# Patient Record
Sex: MALE | Race: WHITE | NOT HISPANIC OR LATINO | URBAN - METROPOLITAN AREA
[De-identification: names, ages, dates, MRNs, and addresses within clinical notes are randomized per-mention and may not be internally consistent; named-entity substitution may affect disease eponyms.]

---

## 2020-02-13 ENCOUNTER — IMMUNIZATION (OUTPATIENT)
Dept: PHARMACY | Facility: CLINIC | Age: 55
End: 2020-02-13

## 2022-01-03 ENCOUNTER — OFFICE VISIT (OUTPATIENT)
Dept: ORTHOPEDICS | Facility: CLINIC | Age: 57
End: 2022-01-03
Payer: COMMERCIAL

## 2022-01-03 ENCOUNTER — TELEPHONE (OUTPATIENT)
Dept: ORTHOPEDICS | Facility: CLINIC | Age: 57
End: 2022-01-03
Payer: COMMERCIAL

## 2022-01-03 ENCOUNTER — HOSPITAL ENCOUNTER (OUTPATIENT)
Dept: RADIOLOGY | Facility: HOSPITAL | Age: 57
Discharge: HOME | End: 2022-01-03
Attending: ORTHOPAEDIC SURGERY
Payer: COMMERCIAL

## 2022-01-03 VITALS — BODY MASS INDEX: 32.89 KG/M2 | WEIGHT: 256.31 LBS | HEIGHT: 74 IN

## 2022-01-03 DIAGNOSIS — M54.2 CERVICAL PAIN: ICD-10-CM

## 2022-01-03 DIAGNOSIS — M54.6 THORACIC BACK PAIN, UNSPECIFIED BACK PAIN LATERALITY, UNSPECIFIED CHRONICITY: ICD-10-CM

## 2022-01-03 DIAGNOSIS — S22.060A CLOSED WEDGE COMPRESSION FRACTURE OF T7 VERTEBRA, INITIAL ENCOUNTER: ICD-10-CM

## 2022-01-03 DIAGNOSIS — M54.50 LUMBAR PAIN: ICD-10-CM

## 2022-01-03 DIAGNOSIS — M54.50 LUMBAR PAIN: Primary | ICD-10-CM

## 2022-01-03 DIAGNOSIS — M47.816 LUMBAR SPONDYLOSIS: Primary | ICD-10-CM

## 2022-01-03 PROCEDURE — 72070 X-RAY EXAM THORAC SPINE 2VWS: CPT | Mod: 26,,, | Performed by: RADIOLOGY

## 2022-01-03 PROCEDURE — 72110 X-RAY EXAM L-2 SPINE 4/>VWS: CPT | Mod: 26,,, | Performed by: RADIOLOGY

## 2022-01-03 PROCEDURE — 72070 XR THORACIC SPINE AP LATERAL: ICD-10-PCS | Mod: 26,,, | Performed by: RADIOLOGY

## 2022-01-03 PROCEDURE — 99999 PR PBB SHADOW E&M-NEW PATIENT-LVL III: ICD-10-PCS | Mod: PBBFAC,,, | Performed by: ORTHOPAEDIC SURGERY

## 2022-01-03 PROCEDURE — 99999 PR PBB SHADOW E&M-NEW PATIENT-LVL III: CPT | Mod: PBBFAC,,, | Performed by: ORTHOPAEDIC SURGERY

## 2022-01-03 PROCEDURE — 72110 XR LUMBAR SPINE AP AND LAT WITH FLEX/EXT: ICD-10-PCS | Mod: 26,,, | Performed by: RADIOLOGY

## 2022-01-03 PROCEDURE — 72110 X-RAY EXAM L-2 SPINE 4/>VWS: CPT | Mod: TC

## 2022-01-03 PROCEDURE — 72050 X-RAY EXAM NECK SPINE 4/5VWS: CPT | Mod: 26,,, | Performed by: RADIOLOGY

## 2022-01-03 PROCEDURE — 99204 PR OFFICE/OUTPT VISIT, NEW, LEVL IV, 45-59 MIN: ICD-10-PCS | Mod: S$GLB,,, | Performed by: ORTHOPAEDIC SURGERY

## 2022-01-03 PROCEDURE — 99204 OFFICE O/P NEW MOD 45 MIN: CPT | Mod: S$GLB,,, | Performed by: ORTHOPAEDIC SURGERY

## 2022-01-03 PROCEDURE — 72050 X-RAY EXAM NECK SPINE 4/5VWS: CPT | Mod: TC

## 2022-01-03 PROCEDURE — 72070 X-RAY EXAM THORAC SPINE 2VWS: CPT | Mod: TC

## 2022-01-03 PROCEDURE — 72050 XR CERVICAL SPINE AP LAT WITH FLEX EXTEN: ICD-10-PCS | Mod: 26,,, | Performed by: RADIOLOGY

## 2022-01-03 RX ORDER — SUCRALFATE 1 G/1
1 TABLET ORAL DAILY
COMMUNITY

## 2022-01-03 RX ORDER — LOPERAMIDE HYDROCHLORIDE 2 MG/1
2 CAPSULE ORAL 2 TIMES DAILY PRN
COMMUNITY
Start: 2021-08-03 | End: 2023-02-28

## 2022-01-03 RX ORDER — ATORVASTATIN CALCIUM 20 MG/1
1 TABLET, FILM COATED ORAL NIGHTLY
COMMUNITY

## 2022-01-03 RX ORDER — TIOTROPIUM BROMIDE AND OLODATEROL 3.124; 2.736 UG/1; UG/1
2 SPRAY, METERED RESPIRATORY (INHALATION) DAILY
COMMUNITY
Start: 2021-10-22

## 2022-01-03 RX ORDER — ACETAMINOPHEN 500 MG
TABLET ORAL
COMMUNITY
End: 2023-02-28

## 2022-01-03 RX ORDER — LEVOTHYROXINE SODIUM 125 UG/1
1 TABLET ORAL DAILY
COMMUNITY
End: 2022-03-25

## 2022-01-03 RX ORDER — BUSPIRONE HYDROCHLORIDE 15 MG/1
TABLET ORAL
COMMUNITY

## 2022-01-03 RX ORDER — PREDNISONE 10 MG/1
TABLET ORAL
COMMUNITY
Start: 2021-12-21 | End: 2022-03-24

## 2022-01-03 RX ORDER — DIVALPROEX SODIUM 250 MG/1
3 TABLET, DELAYED RELEASE ORAL 2 TIMES DAILY
COMMUNITY
Start: 2021-09-08 | End: 2023-02-28

## 2022-01-03 RX ORDER — OMEPRAZOLE 40 MG/1
1 CAPSULE, DELAYED RELEASE ORAL DAILY
COMMUNITY

## 2022-01-03 RX ORDER — METOPROLOL TARTRATE 25 MG/1
1 TABLET, FILM COATED ORAL DAILY
COMMUNITY
End: 2023-02-28

## 2022-01-03 RX ORDER — ERGOCALCIFEROL 1.25 MG/1
1 CAPSULE ORAL
COMMUNITY
End: 2022-03-24

## 2022-01-03 NOTE — PROGRESS NOTES
DATE: 1/3/2022  PATIENT: Binh Garvin    Attending Physician: Riccardo Hurtado M.D.    CHIEF COMPLAINT: back pain    HISTORY:  Binh Garvin is a 56 y.o. male with here for initial evaluation of low back pain (Back - 4). The pain has been present for 25 years. The patient describes the pain as achy.  The pain is worse with prolonged standing and bending over and improved by massage and heat. There is no associated numbness and tingling. There is no subjective weakness. Prior treatments have included tylenol and JOSE J (around 2011), but no surgery. He used to be enrolled in pain management program for his back and took norco and muscle relaxers however when he tested positive for methamphetamine use he was removed from prior pain management program. He reports regret over this and reports quitting completely and is only on tylenol at this time. Smokes 3/4 of a pack per day. Of note was recently admitted to a hospital in [place] for seizures and was intubated per wife.     The Patient denies myelopathic symptoms such as handwriting changes or difficulty with buttons/coins/keys. Denies perineal paresthesias, bowel/bladder dysfunction.    PAST MEDICAL/SURGICAL HISTORY:  Past Medical History:   Diagnosis Date    A-fib     Acid reflux     Agitation     Onofre's esophagus     Chronic back pain     COPD (chronic obstructive pulmonary disease)     Depression     GERD (gastroesophageal reflux disease)     Graves disease     Heart disease     History of stomach ulcers     Hyperlipidemia     Impairment of cognitive function     Memory loss     Migraines     Mood disorder     PTSD (post-traumatic stress disorder)     Seizures     Substance abuse     TBI (traumatic brain injury)      History reviewed. No pertinent surgical history.    Current Medications:   Current Outpatient Medications:     acetaminophen (TYLENOL) 500 MG tablet, 1,000 mg in morning and night, Disp: , Rfl:     atorvastatin (LIPITOR) 20 MG  tablet, Take 1 tablet by mouth every evening., Disp: , Rfl:     busPIRone (BUSPAR) 15 MG tablet, 15mg qam 30mg qhs, Disp: , Rfl:     divalproex (DEPAKOTE) 250 MG EC tablet, Take 3 tablets by mouth 2 (two) times a day., Disp: , Rfl:     ergocalciferol (ERGOCALCIFEROL) 50,000 unit Cap, Take 1 capsule by mouth every 7 days., Disp: , Rfl:     levothyroxine (SYNTHROID) 125 MCG tablet, Take 1 tablet by mouth once daily., Disp: , Rfl:     loperamide (IMODIUM) 2 mg capsule, Take 2 mg by mouth 2 (two) times daily as needed., Disp: , Rfl:     metoprolol tartrate (LOPRESSOR) 25 MG tablet, Take 1 tablet by mouth once daily., Disp: , Rfl:     multivitamin,tx-iron-minerals (COMPLETE MULTIVITAMIN) Tab, morning, Disp: , Rfl:     omeprazole (PRILOSEC) 40 MG capsule, Take 1 capsule by mouth once daily., Disp: , Rfl:     predniSONE (DELTASONE) 10 MG tablet, Take by mouth., Disp: , Rfl:     STIOLTO RESPIMAT 2.5-2.5 mcg/actuation Mist, Take 2 puffs by mouth once daily., Disp: , Rfl:     sucralfate (CARAFATE) 1 gram tablet, Take 1 g by mouth once daily., Disp: , Rfl:     Social History:   Social History     Socioeconomic History    Marital status:    Tobacco Use    Smoking status: Current Every Day Smoker     Types: Cigarettes    Smokeless tobacco: Never Used       REVIEW OF SYSTEMS:  Constitution: Negative. Negative for chills, fever and night sweats.   Cardiovascular: Negative for chest pain and syncope.   Respiratory: Negative for cough and shortness of breath.   Gastrointestinal: See HPI. Negative for nausea/vomiting. Negative for abdominal pain.  Genitourinary: See HPI. Negative for discoloration or dysuria.  Skin: Negative for dry skin, itching and rash.   Hematologic/Lymphatic: Negative for bleeding/clotting disorders.   Musculoskeletal: Negative for falls and muscle weakness.   Neurological: See HPI. Positive significant history of seizures. No history of cranial surgery or shunts.  Endocrine: Negative for  "polydipsia, polyphagia and polyuria.   Allergic/Immunologic: Negative for hives and persistent infections.    PHYSICAL EXAMINATION:    Ht 6' 2" (1.88 m)   Wt 116.3 kg (256 lb 4.6 oz)   BMI 32.91 kg/m²     General: The patient is a pleasant 56 y.o. male in no apparent distress, the patient is orientatied to person, place and time.   Psych: Normal mood and affect  HEENT: Vision grossly intact, hearing intact to the spoken word.  Lungs: Respirations unlabored.  Gait: Normal station and gait, no difficulty with toe or heel walk.   Skin: Dorsal lumbar skin negative for rashes, lesions, hairy patches and surgical scars.  Range of motion: Lumbar range of motion is acceptable. There is mild lumbar tenderness to palpation.  Spinal Balance: Global saggital and coronal spinal balance acceptable, no significant for scoliosis and kyphosis.  Musculoskeletal: No pain with the range of motion of the bilateral hips. No trochanteric tenderness to palpation.  Vascular: Bilateral lower extremities warm and well perfused, Dorsalis pedis pulses 2+ bilaterally.  Neurological: Normal strength and tone in all major motor groups in the bilateral lower extremities. Normal sensation to light touch in the L2-S1 dermatomes bilaterally.  Deep tendon reflexes symmetric 2+ in the bilateral lower extremities.  Negative Babinski bilaterally.    IMAGING:   Today I personally reviewed AP, Lat and Flex/Ex upright L-spine films that demonstrate mild DJD, no fracture, no dislocation, no listhesis.     Outside MRI brought on disc which showed no significant central or foraminal stenosis    Body mass index is 32.91 kg/m².  No results found for: HGBA1C      ASSESSMENT/PLAN:    Binh was seen today for low-back pain and mid-back pain.    Diagnoses and all orders for this visit:    Lumbar spondylosis  -     Ambulatory referral/consult to Pain Clinic; Future    Closed wedge compression fracture of T7 vertebra, initial encounter      Follow up if symptoms " worsen or fail to improve.    Patient has DJD and chronic back pain. I discussed the natural history of their diagnoses as well as surgical and nonsurgical treatment options. I educated the patient on the importance of core/back strengthening, correct posture, bending/lifting ergonomics, and low-impact aerobic exercises (walking, elliptical, and aquatherapy). No acute surgical intervention warranted. I will refer the patient to pain managment for evaluation and treatment. Patient will follow up prn.    I have personally examined the patient and agree with the above plan.    Riccardo Hurtado MD  Orthopaedic Spine Surgeon  Department of Orthopaedic Surgery  [phone]

## 2022-01-10 ENCOUNTER — TELEPHONE (OUTPATIENT)
Dept: PAIN MEDICINE | Facility: CLINIC | Age: 57
End: 2022-01-10
Payer: COMMERCIAL

## 2022-01-10 NOTE — TELEPHONE ENCOUNTER
This message is for patient in regards to his/her appointment 1/11/22 at 9:30 am.      [facility] Policy: For the safety of all patients and staff members.     Patient Visitor policy: Due to social distancing and limited seating staff are requesting patient to arrive to their schedule appointments on time. During this visit we are asking all patients to only have one visitor over the age of 18yrs old to accompany them to be seen by Dr. Jean. If patient do not required assistance with their visit, we're asking all visitors to remain outside the waiting area.    Upon arriving to your schedule appointment; patients are required to wear a face mask, if patient do not have a face mask one will be provided entering the facility. We ask patients to contact clinic at this number [phone] to notify staff that they have arrived or they may do so by utilizing the Mobile checked in [place](if patient have patient portal; clinical staff will send a message through there letting them know it's okay to proceed to their visit). If you have any questions or concerns please contact [phone]       also inquired IPM within message.    [facility] providers and Mid-levels offer interventional, procedure--based options to treat chronic pain. The goal is to manage chronic pain by reducing pain frequency and intensity and address your functional goals for activities of daily living while simultaneously reducing or eliminating your reliance on medications. Please bring any records or images that you have from prior treatments for your pain. You will be presented with multi-modal treatment plan that may or may not include imaging, interventional procedures, physical/occupational/aqua therapy, pain creams, and non-narcotic pain medications used for the treatments of chronic pain.

## 2022-01-11 ENCOUNTER — OFFICE VISIT (OUTPATIENT)
Dept: PAIN MEDICINE | Facility: CLINIC | Age: 57
End: 2022-01-11
Payer: COMMERCIAL

## 2022-01-11 VITALS
HEIGHT: 74 IN | TEMPERATURE: 98 F | WEIGHT: 257 LBS | BODY MASS INDEX: 32.98 KG/M2 | DIASTOLIC BLOOD PRESSURE: 77 MMHG | HEART RATE: 65 BPM | RESPIRATION RATE: 18 BRPM | SYSTOLIC BLOOD PRESSURE: 116 MMHG

## 2022-01-11 DIAGNOSIS — M47.816 LUMBAR SPONDYLOSIS: ICD-10-CM

## 2022-01-11 DIAGNOSIS — M54.16 LUMBAR RADICULOPATHY: Primary | ICD-10-CM

## 2022-01-11 DIAGNOSIS — M54.59 DISCOGENIC LUMBAR PAIN: ICD-10-CM

## 2022-01-11 PROCEDURE — 99204 OFFICE O/P NEW MOD 45 MIN: CPT | Mod: S$GLB,,, | Performed by: ANESTHESIOLOGY

## 2022-01-11 PROCEDURE — 99999 PR PBB SHADOW E&M-EST. PATIENT-LVL V: ICD-10-PCS | Mod: PBBFAC,,, | Performed by: ANESTHESIOLOGY

## 2022-01-11 PROCEDURE — 99204 PR OFFICE/OUTPT VISIT, NEW, LEVL IV, 45-59 MIN: ICD-10-PCS | Mod: S$GLB,,, | Performed by: ANESTHESIOLOGY

## 2022-01-11 PROCEDURE — 99999 PR PBB SHADOW E&M-EST. PATIENT-LVL V: CPT | Mod: PBBFAC,,, | Performed by: ANESTHESIOLOGY

## 2022-01-11 PROCEDURE — 99215 OFFICE O/P EST HI 40 MIN: CPT | Mod: PBBFAC | Performed by: ANESTHESIOLOGY

## 2022-01-11 NOTE — H&P (VIEW-ONLY)
Chronic Pain - New Consult    Referring Physician: Riccardo Hurtado MD    Chief Complaint:   Chief Complaint   Patient presents with    Low-back Pain        SUBJECTIVE:    Binh Garvin presents to the clinic for the evaluation of lower back pain. The pain started over 20 years ago following broken back in college and symptoms have been worsening.The pain is located in the lower back area. Reports that he broke his back over 20 years ago after which he wore a corset for many weeks and he did not need surgery. The pain is described as aching, numbing and sharp and is rated as 4/10. The pain is rated with a score of  2/10 on the BEST day and a score of 9/10 on the WORST day.  Symptoms interfere with daily activity and work. The pain is exacerbated by Sitting, Standing, Bending, Eating, Walking, Night Time, Morning and Lifting. Pain centers to the lumbar region and does not radiate to the hips. The pain is mitigated by heat, massage, medications, physical therapy, rest and sitting. He was getting injections in and around his spine in 6730-1520. At this time and in 2021 he attempted PT but was told to stop by a physician last year and he admits that it was not providing benefit. He has been out of work for a year 2/2 to epilepsy for which he was evaluated at the [facility]. There an MRI spine was done.     He reports spending 10 hours per day reclining. The patient reports 2 hours of uninterrupted sleep per night.     Patient reports bowel incontinence but this is after starting new seizure medications. Denies urinary incontinence.    Physical Therapy/Home Exercise: yes     Pain Disability Index Review:  Last 3 PDI Scores 1/11/2022   Pain Disability Index (PDI) 49       Pain Medications:    - Opioids: NA  - Adjuvant Medications: Tylenol, Lyrica (past), Gabapentin (past)  - Anti-Coagulants: NA  - Others: NA     report:  Not applicable    Pain Procedures: none    Imaging:   MRI L Spine, September 2021     Mild  degenerative changes L5-S1. No severe narrowing of spinal canal      Details    Reading Physician Reading Date Result Priority   Darwin Hoskins III, MD  [phone]  [phone] 1/3/2022 Routine     Narrative & Impression  EXAMINATION:  XR CERVICAL SPINE AP LAT WITH FLEX EXTEN     CLINICAL HISTORY:  Cervicalgia     FINDINGS:  Cervical spine four views: Odontoid prevertebral soft tissues and posterior elements are intact.  There is mild DJD.  No fracture dislocation bone destruction seen.  No trauma seen.  No instability seen.     Impression:     No acute process seen.        Electronically signed by: Darwin Hoskins MD  Date:                                            01/03/2022  Time:                                           12:44                          Reading Physician Reading Date Result Priority   Darwin Hoskins III, MD  [phone]  [phone] 1/3/2022 Routine     Narrative & Impression  EXAMINATION:  XR THORACIC SPINE AP LATERAL     CLINICAL HISTORY:  Pain in thoracic spine     FINDINGS:  Thoracic spine two views: There is a T7 compression deformity.  There is DJD and kyphosis.     Impression:     Compression deformity age indeterminate.  MRI could be helpful.        Electronically signed by: Darwin Hoskins MD  Date:                                            01/03/2022  Time:                                           12:44             Exam Ended: 01/03/22 12:29 Last Resulted: 01/03/22 12:44                                            Reading Physician Reading Date Result Priority   Darwin Hoskins III, MD  [phone]  [phone] 1/3/2022 Routine     Narrative & Impression  EXAMINATION:  XR LUMBAR SPINE AP AND LAT WITH FLEX/EXT     CLINICAL HISTORY:  Low back pain, unspecified     FINDINGS:  Lumbar spine four views: There is a mild levoconvex curvature.  There is mild DJD.  No fracture dislocation bone destruction seen.  No trauma or instability seen.     Impression:     No acute process  seen.        Electronically signed by: Darwin Hoskins MD  Date:                                            01/03/2022  Time:                                           12:43      Past Medical History:   Diagnosis Date    A-fib     Acid reflux     Agitation     Onofre's esophagus     Chronic back pain     COPD (chronic obstructive pulmonary disease)     Depression     GERD (gastroesophageal reflux disease)     Graves disease     Heart disease     History of stomach ulcers     Hyperlipidemia     Impairment of cognitive function     Memory loss     Migraines     Mood disorder     PTSD (post-traumatic stress disorder)     Seizures     Substance abuse     TBI (traumatic brain injury)      No past surgical history on file.  Social History     Socioeconomic History    Marital status:    Tobacco Use    Smoking status: Current Every Day Smoker     Types: Cigarettes    Smokeless tobacco: Never Used     No family history on file.    Review of patient's allergies indicates:   Allergen Reactions    Celecoxib Diarrhea    Nsaids (non-steroidal anti-inflammatory drug) Other (See Comments)    Lacosamide Anxiety, Hallucinations and Nausea And Vomiting    Levetiracetam Anxiety       Current Outpatient Medications   Medication Sig    acetaminophen (TYLENOL) 500 MG tablet 1,000 mg in morning and night    atorvastatin (LIPITOR) 20 MG tablet Take 1 tablet by mouth every evening.    busPIRone (BUSPAR) 15 MG tablet 15mg qam 30mg qhs    divalproex (DEPAKOTE) 250 MG EC tablet Take 3 tablets by mouth 2 (two) times a day.    ergocalciferol (ERGOCALCIFEROL) 50,000 unit Cap Take 1 capsule by mouth every 7 days.    levothyroxine (SYNTHROID) 125 MCG tablet Take 1 tablet by mouth once daily.    loperamide (IMODIUM) 2 mg capsule Take 2 mg by mouth 2 (two) times daily as needed.    metoprolol tartrate (LOPRESSOR) 25 MG tablet Take 1 tablet by mouth once daily.    multivitamin,tx-iron-minerals (COMPLETE  "MULTIVITAMIN) Tab morning    omeprazole (PRILOSEC) 40 MG capsule Take 1 capsule by mouth once daily.    predniSONE (DELTASONE) 10 MG tablet Take by mouth.    STIOLTO RESPIMAT 2.5-2.5 mcg/actuation Mist Take 2 puffs by mouth once daily.    sucralfate (CARAFATE) 1 gram tablet Take 1 g by mouth once daily.     No current facility-administered medications for this visit.       REVIEW OF SYSTEMS:    Review of Systems   Constitutional: Positive for malaise/fatigue. Negative for diaphoresis.        Weight gain    HENT: Negative.    Eyes: Negative.    Respiratory: Negative.    Cardiovascular: Negative.    Gastrointestinal: Positive for diarrhea.   Genitourinary: Negative.    Musculoskeletal: Positive for back pain. Negative for myalgias and neck pain.   Neurological: Negative.        OBJECTIVE:    /77 (BP Location: Right arm, Patient Position: Sitting, BP Method: Large (Automatic))   Pulse 65   Temp 97.7 °F (36.5 °C)   Resp 18   Ht 6' 2" (1.88 m)   Wt 116.6 kg (257 lb)   BMI 33.00 kg/m²     PHYSICAL EXAMINATION:    Right Hip Exam   Right hip exam is normal.     Muscle Strength   Abduction: 5/5   Adduction: 5/5   Flexion: 5/5     Tests   GENEVA: negative      Left Hip Exam   Left hip exam is normal.    Muscle Strength   Abduction: 5/5   Adduction: 5/5   Flexion: 5/5     Tests   GENEVA: negative      Back Exam     Tenderness   The patient is experiencing no tenderness.     Range of Motion   Back flexion: discomfort      Muscle Strength   The patient has normal back strength.    Tests   Straight leg raise right: negative  Straight leg raise left: negative              ASSESSMENT: 56 y.o. year old male with     1. Lumbar radiculopathy  Procedure Order to Pain Management    Ambulatory referral/consult to Physical/Occupational Therapy    Procedure Order to Pain Management   2. Lumbar spondylosis  Ambulatory referral/consult to Pain Clinic   3. Discogenic lumbar pain           PLAN:     - I have stressed the " importance of physical activity and a home exercise plan to help with pain and improve health.  - Referral to Physical therapy for Lumbar stabilization, core strengthening, and a home exercise program.  - Obtain old records from outside physicians and imaging. Copy MRI file into chart and send disk to imaging so that MRI is viewable in EPIC  - Patient can continue with medications for now since they are providing benefits, using them appropriately, and without side effects.  - Schedule for a Lumbar Epidural Steroid Injection at L5/S1 to help withpain and progress with a Home exercise program.  - We will consider lumbar MBB in  The future.  - RTC 2 weeks after the procedure.  - Counseled patient regarding the importance of activity modification and physical therapy.    The above plan and management options were discussed at length with patient. Patient is in agreement with the above and verbalized understanding. It will be communicated with the referring physician via electronic record, fax, or mail.    Dejuan Welsh  01/11/2022     I have reviewed and concur with the resident's history, physical, assessment, and plan.  I have personally interviewed and examined the patient at bedside.  See below addendum for my evaluation and additional findings.    Devang Jean MD

## 2022-01-11 NOTE — PROGRESS NOTES
Chronic Pain - New Consult    Referring Physician: Riccardo Hurtado MD    Chief Complaint:   Chief Complaint   Patient presents with    Low-back Pain        SUBJECTIVE:    Binh Garvin presents to the clinic for the evaluation of lower back pain. The pain started over 20 years ago following broken back in college and symptoms have been worsening.The pain is located in the lower back area. Reports that he broke his back over 20 years ago after which he wore a corset for many weeks and he did not need surgery. The pain is described as aching, numbing and sharp and is rated as 4/10. The pain is rated with a score of  2/10 on the BEST day and a score of 9/10 on the WORST day.  Symptoms interfere with daily activity and work. The pain is exacerbated by Sitting, Standing, Bending, Eating, Walking, Night Time, Morning and Lifting. Pain centers to the lumbar region and does not radiate to the hips. The pain is mitigated by heat, massage, medications, physical therapy, rest and sitting. He was getting injections in and around his spine in [id number]. At this time and in 2021 he attempted PT but was told to stop by a physician last year and he admits that it was not providing benefit. He has been out of work for a year 2/2 to epilepsy for which he was evaluated at the [facility]. There an MRI spine was done.     He reports spending 10 hours per day reclining. The patient reports 2 hours of uninterrupted sleep per night.     Patient reports bowel incontinence but this is after starting new seizure medications. Denies urinary incontinence.    Physical Therapy/Home Exercise: yes     Pain Disability Index Review:  Last 3 PDI Scores 1/11/2022   Pain Disability Index (PDI) 49       Pain Medications:    - Opioids: NA  - Adjuvant Medications: Tylenol, Lyrica (past), Gabapentin (past)  - Anti-Coagulants: NA  - Others: NA     report:  Not applicable    Pain Procedures: none    Imaging:   MRI L Spine, September 2021     Mild  degenerative changes L5-S1. No severe narrowing of spinal canal      Details    Reading Physician Reading Date Result Priority   Darwin Hoskins III, MD  [phone]  [phone] 1/3/2022 Routine     Narrative & Impression  EXAMINATION:  XR CERVICAL SPINE AP LAT WITH FLEX EXTEN     CLINICAL HISTORY:  Cervicalgia     FINDINGS:  Cervical spine four views: Odontoid prevertebral soft tissues and posterior elements are intact.  There is mild DJD.  No fracture dislocation bone destruction seen.  No trauma seen.  No instability seen.     Impression:     No acute process seen.        Electronically signed by: Darwin Hoskins MD  Date:                                            01/03/2022  Time:                                           12:44                          Reading Physician Reading Date Result Priority   Darwin Hoskins III, MD  [phone]  [phone] 1/3/2022 Routine     Narrative & Impression  EXAMINATION:  XR THORACIC SPINE AP LATERAL     CLINICAL HISTORY:  Pain in thoracic spine     FINDINGS:  Thoracic spine two views: There is a T7 compression deformity.  There is DJD and kyphosis.     Impression:     Compression deformity age indeterminate.  MRI could be helpful.        Electronically signed by: Darwin Hoskins MD  Date:                                            01/03/2022  Time:                                           12:44             Exam Ended: 01/03/22 12:29 Last Resulted: 01/03/22 12:44                                            Reading Physician Reading Date Result Priority   Darwin Hoskins III, MD  [phone]  [phone] 1/3/2022 Routine     Narrative & Impression  EXAMINATION:  XR LUMBAR SPINE AP AND LAT WITH FLEX/EXT     CLINICAL HISTORY:  Low back pain, unspecified     FINDINGS:  Lumbar spine four views: There is a mild levoconvex curvature.  There is mild DJD.  No fracture dislocation bone destruction seen.  No trauma or instability seen.     Impression:     No acute process  seen.        Electronically signed by: Darwin Hoskins MD  Date:                                            01/03/2022  Time:                                           12:43      Past Medical History:   Diagnosis Date    A-fib     Acid reflux     Agitation     Onofre's esophagus     Chronic back pain     COPD (chronic obstructive pulmonary disease)     Depression     GERD (gastroesophageal reflux disease)     Graves disease     Heart disease     History of stomach ulcers     Hyperlipidemia     Impairment of cognitive function     Memory loss     Migraines     Mood disorder     PTSD (post-traumatic stress disorder)     Seizures     Substance abuse     TBI (traumatic brain injury)      No past surgical history on file.  Social History     Socioeconomic History    Marital status:    Tobacco Use    Smoking status: Current Every Day Smoker     Types: Cigarettes    Smokeless tobacco: Never Used     No family history on file.    Review of patient's allergies indicates:   Allergen Reactions    Celecoxib Diarrhea    Nsaids (non-steroidal anti-inflammatory drug) Other (See Comments)    Lacosamide Anxiety, Hallucinations and Nausea And Vomiting    Levetiracetam Anxiety       Current Outpatient Medications   Medication Sig    acetaminophen (TYLENOL) 500 MG tablet 1,000 mg in morning and night    atorvastatin (LIPITOR) 20 MG tablet Take 1 tablet by mouth every evening.    busPIRone (BUSPAR) 15 MG tablet 15mg qam 30mg qhs    divalproex (DEPAKOTE) 250 MG EC tablet Take 3 tablets by mouth 2 (two) times a day.    ergocalciferol (ERGOCALCIFEROL) 50,000 unit Cap Take 1 capsule by mouth every 7 days.    levothyroxine (SYNTHROID) 125 MCG tablet Take 1 tablet by mouth once daily.    loperamide (IMODIUM) 2 mg capsule Take 2 mg by mouth 2 (two) times daily as needed.    metoprolol tartrate (LOPRESSOR) 25 MG tablet Take 1 tablet by mouth once daily.    multivitamin,tx-iron-minerals (COMPLETE  "MULTIVITAMIN) Tab morning    omeprazole (PRILOSEC) 40 MG capsule Take 1 capsule by mouth once daily.    predniSONE (DELTASONE) 10 MG tablet Take by mouth.    STIOLTO RESPIMAT 2.5-2.5 mcg/actuation Mist Take 2 puffs by mouth once daily.    sucralfate (CARAFATE) 1 gram tablet Take 1 g by mouth once daily.     No current facility-administered medications for this visit.       REVIEW OF SYSTEMS:    Review of Systems   Constitutional: Positive for malaise/fatigue. Negative for diaphoresis.        Weight gain    HENT: Negative.    Eyes: Negative.    Respiratory: Negative.    Cardiovascular: Negative.    Gastrointestinal: Positive for diarrhea.   Genitourinary: Negative.    Musculoskeletal: Positive for back pain. Negative for myalgias and neck pain.   Neurological: Negative.        OBJECTIVE:    /77 (BP Location: Right arm, Patient Position: Sitting, BP Method: Large (Automatic))   Pulse 65   Temp 97.7 °F (36.5 °C)   Resp 18   Ht 6' 2" (1.88 m)   Wt 116.6 kg (257 lb)   BMI 33.00 kg/m²     PHYSICAL EXAMINATION:    Right Hip Exam   Right hip exam is normal.     Muscle Strength   Abduction: 5/5   Adduction: 5/5   Flexion: 5/5     Tests   GENEVA: negative      Left Hip Exam   Left hip exam is normal.    Muscle Strength   Abduction: 5/5   Adduction: 5/5   Flexion: 5/5     Tests   GENEVA: negative      Back Exam     Tenderness   The patient is experiencing no tenderness.     Range of Motion   Back flexion: discomfort      Muscle Strength   The patient has normal back strength.    Tests   Straight leg raise right: negative  Straight leg raise left: negative              ASSESSMENT: 56 y.o. year old male with     1. Lumbar radiculopathy  Procedure Order to Pain Management    Ambulatory referral/consult to Physical/Occupational Therapy    Procedure Order to Pain Management   2. Lumbar spondylosis  Ambulatory referral/consult to Pain Clinic   3. Discogenic lumbar pain           PLAN:     - I have stressed the " importance of physical activity and a home exercise plan to help with pain and improve health.  - Referral to Physical therapy for Lumbar stabilization, core strengthening, and a home exercise program.  - Obtain old records from outside physicians and imaging. Copy MRI file into chart and send disk to imaging so that MRI is viewable in EPIC  - Patient can continue with medications for now since they are providing benefits, using them appropriately, and without side effects.  - Schedule for a Lumbar Epidural Steroid Injection at L5/S1 to help withpain and progress with a Home exercise program.  - We will consider lumbar MBB in  The future.  - RTC 2 weeks after the procedure.  - Counseled patient regarding the importance of activity modification and physical therapy.    The above plan and management options were discussed at length with patient. Patient is in agreement with the above and verbalized understanding. It will be communicated with the referring physician via electronic record, fax, or mail.    Dejuan Welsh  01/11/2022     I have reviewed and concur with the resident's history, physical, assessment, and plan.  I have personally interviewed and examined the patient at bedside.  See below addendum for my evaluation and additional findings.    Devang Jean MD

## 2022-01-13 ENCOUNTER — TELEPHONE (OUTPATIENT)
Dept: PAIN MEDICINE | Facility: CLINIC | Age: 57
End: 2022-01-13
Payer: COMMERCIAL

## 2022-01-13 NOTE — TELEPHONE ENCOUNTER
Staff returned call and left a voicemail         Staff informed patient and his spouse in message that patient is schedule for INJECTION, STEROID, EPIDURAL L4/5, L5/S1  On 01/28/22 with Pain Provider Dr. Devang Jean MD also pt spouse stated in message about scheduling Physical Therapy that staff member from that department contacted them but was disconnected

## 2022-01-13 NOTE — TELEPHONE ENCOUNTER
----- Message from Thomas Garcia sent at 1/13/2022 10:48 AM CST -----  Regarding: Pt Injections Request  Name of Who is Calling:JAVI PATEL [[id number]]    What is the request in detail: Pt wife is req a nurse to call about scheduling  injections as well as scheduling orders for physical therapy. Pt wife is asking for a call back.    Can the clinic reply by [facility]:No    What Number to Call Back if not in [facility]:[phone]

## 2022-01-19 ENCOUNTER — PATIENT MESSAGE (OUTPATIENT)
Dept: PAIN MEDICINE | Facility: OTHER | Age: 57
End: 2022-01-19
Payer: COMMERCIAL

## 2022-01-19 ENCOUNTER — TELEPHONE (OUTPATIENT)
Dept: PAIN MEDICINE | Facility: CLINIC | Age: 57
End: 2022-01-19
Payer: COMMERCIAL

## 2022-01-19 NOTE — TELEPHONE ENCOUNTER
----- Message from Madeleine Aguayo sent at 1/19/2022  9:30 AM CST -----  Regarding: Covid  Name of Who is Calling: Nisreen Patel on behalf of JAVI PATEL [[id number]]           What is the request in detail: Patient's wife is requesting a call back to discuss COVID testing prior to procedure.  Please assist.           Can the clinic reply by [facility]: No           What [facility]: [phone]

## 2022-01-21 ENCOUNTER — HOSPITAL ENCOUNTER (OUTPATIENT)
Facility: OTHER | Age: 57
Discharge: HOME | End: 2022-01-21
Attending: ANESTHESIOLOGY | Admitting: ANESTHESIOLOGY
Payer: COMMERCIAL

## 2022-01-21 VITALS
SYSTOLIC BLOOD PRESSURE: 128 MMHG | HEART RATE: 64 BPM | HEIGHT: 74 IN | TEMPERATURE: 99 F | OXYGEN SATURATION: 96 % | DIASTOLIC BLOOD PRESSURE: 69 MMHG | RESPIRATION RATE: 16 BRPM | WEIGHT: 254 LBS | BODY MASS INDEX: 32.6 KG/M2

## 2022-01-21 DIAGNOSIS — G89.29 CHRONIC PAIN: ICD-10-CM

## 2022-01-21 DIAGNOSIS — M54.16 LUMBAR RADICULOPATHY: Primary | ICD-10-CM

## 2022-01-21 PROCEDURE — 62323 NJX INTERLAMINAR LMBR/SAC: CPT | Mod: ,,, | Performed by: ANESTHESIOLOGY

## 2022-01-21 PROCEDURE — 63600175 PHARM REV CODE 636 W HCPCS: Performed by: ANESTHESIOLOGY

## 2022-01-21 PROCEDURE — 25500020 PHARM REV CODE 255: Performed by: ANESTHESIOLOGY

## 2022-01-21 PROCEDURE — 62323 PR INJ LUMBAR/SACRAL, W/IMAGING GUIDANCE: ICD-10-PCS | Mod: ,,, | Performed by: ANESTHESIOLOGY

## 2022-01-21 PROCEDURE — 25000003 PHARM REV CODE 250: Performed by: ANESTHESIOLOGY

## 2022-01-21 PROCEDURE — 62323 NJX INTERLAMINAR LMBR/SAC: CPT | Performed by: ANESTHESIOLOGY

## 2022-01-21 RX ORDER — MIDAZOLAM HYDROCHLORIDE 1 MG/ML
INJECTION INTRAMUSCULAR; INTRAVENOUS
Status: DISCONTINUED | OUTPATIENT
Start: 2022-01-21 | End: 2022-01-21 | Stop reason: HOSPADM

## 2022-01-21 RX ORDER — LIDOCAINE HYDROCHLORIDE 20 MG/ML
INJECTION, SOLUTION INFILTRATION; PERINEURAL
Status: DISCONTINUED | OUTPATIENT
Start: 2022-01-21 | End: 2022-01-21 | Stop reason: HOSPADM

## 2022-01-21 RX ORDER — LIDOCAINE HYDROCHLORIDE 10 MG/ML
INJECTION, SOLUTION EPIDURAL; INFILTRATION; INTRACAUDAL; PERINEURAL
Status: DISCONTINUED | OUTPATIENT
Start: 2022-01-21 | End: 2022-01-21 | Stop reason: HOSPADM

## 2022-01-21 RX ORDER — DEXAMETHASONE SODIUM PHOSPHATE 10 MG/ML
INJECTION INTRAMUSCULAR; INTRAVENOUS
Status: DISCONTINUED | OUTPATIENT
Start: 2022-01-21 | End: 2022-01-21 | Stop reason: HOSPADM

## 2022-01-21 RX ORDER — FENTANYL CITRATE 50 UG/ML
INJECTION, SOLUTION INTRAMUSCULAR; INTRAVENOUS
Status: DISCONTINUED | OUTPATIENT
Start: 2022-01-21 | End: 2022-01-21 | Stop reason: HOSPADM

## 2022-01-21 RX ORDER — SODIUM CHLORIDE 9 MG/ML
INJECTION, SOLUTION INTRAVENOUS CONTINUOUS
Status: DISCONTINUED | OUTPATIENT
Start: 2022-01-21 | End: 2022-01-21 | Stop reason: HOSPADM

## 2022-01-21 NOTE — OP NOTE
Lumbar Interlaminar Epidural Steroid Injection under Fluoroscopic Guidance    The procedure, risks, benefits, and options were discussed with the patient. There are no contraindications to the procedure. The patent expressed understanding and agreed to the procedure. Informed written consent was obtained prior to the start of the procedure and can be found in the patient's chart.    PATIENT NAME: Binh Garvin   MRN: [id number]     DATE OF PROCEDURE: 01/21/2022    PROCEDURE: Lumbar Interlaminar Epidural Steroid Injection L4/L5 under Fluoroscopic Guidance    PRE-OP DIAGNOSIS: Lumbar radiculopathy [M54.16]    POST-OP DIAGNOSIS: Same    PHYSICIAN: Devang Jean MD    ASSISTANTS: Dr. Millard     MEDICATIONS INJECTED: Preservative-free Decadron 10mg with 4cc of Lidocaine 1% MPF and preservative free normal saline    LOCAL ANESTHETIC INJECTED: Xylocaine 2%     SEDATION:   Versed 2mg and Fentanyl 50mcg                                                                                                                                                                                     Conscious sedation ordered by M.D. Patient re-evaluation prior to administration of conscious sedation. No changes noted in patient's status from initial evaluation. The patient's vital signs were monitored by RN and patient remained hemodynamically stable throughout the procedure.    Event Time In   Sedation Start 1524   Sedation End 1529       ESTIMATED BLOOD LOSS: None    COMPLICATIONS: None    TECHNIQUE: Time-out was performed to identify the patient and procedure to be performed. With the patient laying in a prone position, the surgical area was prepped and draped in the usual sterile fashion using ChloraPrep and a fenestrated drape. The level was determined under fluoroscopy guidance. Skin anesthesia was achieved by injecting Lidocaine 2% over the injection site. The interlaminar space was then approached with a 20 gauge,  3.5 inch Tuohy needle  that was introduced under fluoroscopic guidance in the AP, lateral and/or contralateral oblique imaging. Once the Ligamentum flavum was encountered loss of resistance to saline was used to enter the epidural space. With positive loss of resistance and negative aspiration for CSF or Blood, contrast dye Omnipaque (300mg/ml) was injected to confirm placement and there was no vascular runoff. 6 mL of the medication mixture listed above was injected slowly. Displacement of the radio opaque contrast after injection of the medication confirmed that the medication went into the area of the epidural space. The needles were removed and bleeding was nil. A sterile dressing was applied. No specimens collected. The patient tolerated the procedure well.       The patient was monitored after the procedure in the recovery area. They were given post-procedure and discharge instructions to follow at home. The patient was discharged in a stable condition.    Devang Jean MD

## 2022-01-21 NOTE — DISCHARGE SUMMARY
Discharge Note  Short Stay      SUMMARY     Admit Date: 1/21/2022    Attending Physician: Devang Jean      Discharge Physician: Devang Jean      Discharge Date: 1/21/2022 3:30 PM    Procedure(s) (LRB):  INJECTION, STEROID, EPIDURAL L4/5 NEEDS CONSENT (N/A)    Final Diagnosis: Lumbar radiculopathy [M54.16]    Disposition: Home or self care    Patient Instructions:   Current Discharge Medication List      CONTINUE these medications which have NOT CHANGED    Details   acetaminophen (TYLENOL) 500 MG tablet 1,000 mg in morning and night      atorvastatin (LIPITOR) 20 MG tablet Take 1 tablet by mouth every evening.      busPIRone (BUSPAR) 15 MG tablet 15mg qam 30mg qhs      divalproex (DEPAKOTE) 250 MG EC tablet Take 3 tablets by mouth 2 (two) times a day.      ergocalciferol (ERGOCALCIFEROL) 50,000 unit Cap Take 1 capsule by mouth every 7 days.      levothyroxine (SYNTHROID) 125 MCG tablet Take 1 tablet by mouth once daily.      loperamide (IMODIUM) 2 mg capsule Take 2 mg by mouth 2 (two) times daily as needed.      metoprolol tartrate (LOPRESSOR) 25 MG tablet Take 1 tablet by mouth once daily.      multivitamin,tx-iron-minerals (COMPLETE MULTIVITAMIN) Tab morning      omeprazole (PRILOSEC) 40 MG capsule Take 1 capsule by mouth once daily.      predniSONE (DELTASONE) 10 MG tablet Take by mouth.      STIOLTO RESPIMAT 2.5-2.5 mcg/actuation Mist Take 2 puffs by mouth once daily.      sucralfate (CARAFATE) 1 gram tablet Take 1 g by mouth once daily.                 Discharge Diagnosis: Lumbar radiculopathy [M54.16]  Condition on Discharge: Stable with no complications to procedure   Diet on Discharge: Same as before.  Activity: as per instruction sheet.  Discharge to: Home with a responsible adult.  Follow up: 2-4 weeks

## 2022-01-21 NOTE — DISCHARGE INSTRUCTIONS
Thank you for allowing us to care for you today. You may receive a survey about the care we provided. Your feedback is valuable and helps us provide excellent care throughout the community.     Home Care Instructions for Pain Management:    1. DIET:   You may resume your normal diet today.   2. BATHING:   You may shower with luke warm water. No tub baths or anything that will soak injection sites under water for the next 24 hours.  3. DRESSING:   You may remove your bandage today.   4. ACTIVITY LEVEL:   You may resume your normal activities 24 hrs after your procedure. Nothing strenuous today.  5. MEDICATIONS:   You may resume your normal medications today. To restart blood thinners, ask your doctor.  6. DRIVING    If you have received any sedatives by mouth today, you may not drive for 12 hours.    If you have received any sedation through your IV, you may not drive for 24 hrs.   7. SPECIAL INSTRUCTIONS:   No heat to the injection site for 24 hrs including, hot bath or shower, heating pad, moist heat, or hot tubs.    Use ice pack to injection site for any pain or discomfort.  Apply ice packs for 20 minute intervals as needed.    IF you have diabetes, be sure to monitor your blood sugar more closely. IF your injection contained steroids your blood sugar levels may become higher than normal.    If you are still having pain upon discharge:  Your pain may improve over the next 48 hours. The anesthetic (numbing medication) works immediately to 48 hours. IF your injection contained a steroid (anti-inflammatory medication), it takes approximately 3 days to start feeling relief and 7-10 days to see your greatest results from the medication. It is possible you may need subsequent injections. This would be discussed at your follow up appointment with pain management or your referring doctor.    Please call the PAIN MANAGEMENT office at [phone] or ON CALL pager at [phone] if you experienced any:   -Weakness or  loss of sensation  -Fever > 101.5  -Pain uncontrolled with oral medications   -Persistent nausea, vomiting, or diarrhea  -Redness or drainage from the injection sites, or any other worrisome concerns.   If physician on call was not reached or could not communicate with our office for any reason please go to the nearest emergency department.  Patient Education       Moderate and Deep Sedation in Adults   About this topic   Sedation changes a persons level of consciousness or being awake. Doctors give moderate or deep sedation to help you become more comfortable when they need to do a procedure. The staff watch you closely when you are given sedation. With sedation, you may not remember the procedure when it is over. The level of your sedation may be moderate or deep.  With moderate or conscious sedation, you:  · Will be relaxed and calm.  · May seem to sleep.  · May feel only slight pain or no pain at all.  · May talk and answer questions.  · Breathe on your own.  With deep sedation, you:  · Will be relaxed and calm.  · May seem to sleep.  · May feel only slight pain or no pain at all.  · Are not able talk or answer questions.  · May need help keeping your airway open or breathing.  Sedation is given by someone with special training. This is someone like a:  · Certified registered nurse anesthetist (CRNA)  · Anesthesiologist  · Doctor  · Dentist  · Oral surgeon  Why is this procedure done?   Sedation is most often given for procedures such as:  · Minor surgeries or procedures, like taking a tissue sample or fixing a broken bone  · Colonoscopy  · Vasectomy  · Dental surgery, like an implant or taking out an impacted tooth  · Endoscopy  · Bronchoscopy  · Plastic cosmetic surgery  · Endotracheal procedure  What happens before the procedure?   · Your doctor will take your history and do an exam. Tell the doctor if you have any drug allergy. Talk to the doctor about:  ? All the drugs you are taking. Be sure to include  all prescription, over-the-counter, vitamins, and herbal supplements. Bring a list of drugs you take with you.  ? Any bleeding problems. Be sure to tell your doctor if you are taking any drugs that may cause bleeding. Some of these are warfarin, rivaroxaban, apixaban, ticagrelor, clopidogrel, ibuprofen, naproxen, or aspirin. Certain vitamins and herbs, such as garlic and fish oil, may also add to the risk for bleeding. You may need to stop these drugs as well. Talk to your doctor about them.  ? Any previous problems with sedation or anesthesia.  · Talk with the doctor about when you last ate or drank anything.  · You will not be allowed to drive after the procedure. Plan another way to get home.  What happens after the procedure?   You may feel these side effects:  · Headache  · Upset stomach or throwing up  · Hangover feeling  · Short period of no memory or cloudy memory  · Bad memories  · Confusion or hallucinations  What care is needed at home?   · Do not make major decisions or sign important papers for at least 24 hours. You may not be thinking clearly.  · Do not drive or operate machinery for 24 hours or until OK'd by your doctor.  What problems could happen?   · Low blood pressure  · Breathing problems  · Heart problems  · Allergic reactions  Last Reviewed Date   2021-05-06  Consumer Information Use and Disclaimer   This information is not specific medical advice and does not replace information you receive from your health care provider. This is only a brief summary of general information. It does NOT include all information about conditions, illnesses, injuries, tests, procedures, treatments, therapies, discharge instructions or life-style choices that may apply to you. You must talk with your health care provider for complete information about your health and treatment options. This information should not be used to decide whether or not to accept your health care providers advice, instructions or  recommendations. Only your health care provider has the knowledge and training to provide advice that is right for you.  Copyright   Copyright © 2021 [employer] and its affiliates and/or licensors. All rights reserved.

## 2022-02-21 ENCOUNTER — OFFICE VISIT (OUTPATIENT)
Dept: PAIN MEDICINE | Facility: CLINIC | Age: 57
End: 2022-02-21
Payer: COMMERCIAL

## 2022-02-21 VITALS
TEMPERATURE: 98 F | HEART RATE: 64 BPM | RESPIRATION RATE: 18 BRPM | DIASTOLIC BLOOD PRESSURE: 85 MMHG | OXYGEN SATURATION: 96 % | WEIGHT: 253 LBS | HEIGHT: 74 IN | SYSTOLIC BLOOD PRESSURE: 117 MMHG | BODY MASS INDEX: 32.47 KG/M2

## 2022-02-21 DIAGNOSIS — M47.816 LUMBAR SPONDYLOSIS: Primary | ICD-10-CM

## 2022-02-21 DIAGNOSIS — M51.36 DDD (DEGENERATIVE DISC DISEASE), LUMBAR: ICD-10-CM

## 2022-02-21 DIAGNOSIS — G89.4 CHRONIC PAIN DISORDER: ICD-10-CM

## 2022-02-21 PROCEDURE — 3079F DIAST BP 80-89 MM HG: CPT | Mod: CPTII,S$GLB,, | Performed by: ANESTHESIOLOGY

## 2022-02-21 PROCEDURE — 3074F SYST BP LT 130 MM HG: CPT | Mod: CPTII,S$GLB,, | Performed by: ANESTHESIOLOGY

## 2022-02-21 PROCEDURE — 1159F PR MEDICATION LIST DOCUMENTED IN MEDICAL RECORD: ICD-10-PCS | Mod: CPTII,S$GLB,, | Performed by: ANESTHESIOLOGY

## 2022-02-21 PROCEDURE — 99214 OFFICE O/P EST MOD 30 MIN: CPT | Mod: S$GLB,,, | Performed by: ANESTHESIOLOGY

## 2022-02-21 PROCEDURE — 3074F PR MOST RECENT SYSTOLIC BLOOD PRESSURE < 130 MM HG: ICD-10-PCS | Mod: CPTII,S$GLB,, | Performed by: ANESTHESIOLOGY

## 2022-02-21 PROCEDURE — 1160F RVW MEDS BY RX/DR IN RCRD: CPT | Mod: CPTII,S$GLB,, | Performed by: ANESTHESIOLOGY

## 2022-02-21 PROCEDURE — 3079F PR MOST RECENT DIASTOLIC BLOOD PRESSURE 80-89 MM HG: ICD-10-PCS | Mod: CPTII,S$GLB,, | Performed by: ANESTHESIOLOGY

## 2022-02-21 PROCEDURE — 1160F PR REVIEW ALL MEDS BY PRESCRIBER/CLIN PHARMACIST DOCUMENTED: ICD-10-PCS | Mod: CPTII,S$GLB,, | Performed by: ANESTHESIOLOGY

## 2022-02-21 PROCEDURE — 99999 PR PBB SHADOW E&M-EST. PATIENT-LVL V: ICD-10-PCS | Mod: PBBFAC,,, | Performed by: ANESTHESIOLOGY

## 2022-02-21 PROCEDURE — 3008F BODY MASS INDEX DOCD: CPT | Mod: CPTII,S$GLB,, | Performed by: ANESTHESIOLOGY

## 2022-02-21 PROCEDURE — 1159F MED LIST DOCD IN RCRD: CPT | Mod: CPTII,S$GLB,, | Performed by: ANESTHESIOLOGY

## 2022-02-21 PROCEDURE — 99999 PR PBB SHADOW E&M-EST. PATIENT-LVL V: CPT | Mod: PBBFAC,,, | Performed by: ANESTHESIOLOGY

## 2022-02-21 PROCEDURE — 3008F PR BODY MASS INDEX (BMI) DOCUMENTED: ICD-10-PCS | Mod: CPTII,S$GLB,, | Performed by: ANESTHESIOLOGY

## 2022-02-21 PROCEDURE — 99214 PR OFFICE/OUTPT VISIT, EST, LEVL IV, 30-39 MIN: ICD-10-PCS | Mod: S$GLB,,, | Performed by: ANESTHESIOLOGY

## 2022-02-21 NOTE — H&P (VIEW-ONLY)
Chronic patient Established Note (Follow up visit)     Interval History 2/21/2022:    Binh Garvin presents to the clinic for a follow-up appointment for chronic lower back pain with no radiation. Since the last visit, Binh Garvin states the pain has been persistant. Current pain intensity is 7/10. He reports no relief from LESI.       HPI:    Binh Garvin presents to the clinic for the evaluation of lower back pain. The pain started over 20 years ago following broken back in college and symptoms have been worsening.The pain is located in the lower back area. Reports that he broke his back over 20 years ago after which he wore a corset for many weeks and he did not need surgery. The pain is described as aching, numbing and sharp and is rated as 4/10. The pain is rated with a score of  2/10 on the BEST day and a score of 9/10 on the WORST day.  Symptoms interfere with daily activity and work. The pain is exacerbated by Sitting, Standing, Bending, Eating, Walking, Night Time, Morning and Lifting. Pain centers to the lumbar region and does not radiate to the hips. The pain is mitigated by heat, massage, medications, physical therapy, rest and sitting. He was getting injections in and around his spine in 5108-6530. At this time and in 2021 he attempted PT but was told to stop by a physician last year and he admits that it was not providing benefit. He has been out of work for a year 2/2 to epilepsy for which he was evaluated at the [facility]. There an MRI spine was done.      He reports spending 10 hours per day reclining. The patient reports 2 hours of uninterrupted sleep per night.      Patient reports bowel incontinence but this is after starting new seizure medications. Denies urinary incontinence.     Physical Therapy/Home Exercise: yes     Pain Disability Index Review:  Last 3 PDI Scores 2/21/2022 1/11/2022   Pain Disability Index (PDI) 6 49       Pain Medications:  Tylenol, Lyrica (past), Gabapentin  (past)      Opioid Contract: not applicable     report:  Not applicable    Pain Procedures:     2022: Lumbar Interlaminar Epidural Steroid Injection L4/L5 under Fluoroscopic Guidance with no relief.    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      busPIRone (BUSPAR) 15 MG tablet 15mg qam 30mg qhs      divalproex (DEPAKOTE) 250 MG EC tablet Take 3 tablets by mouth 2 (two) times a day.      ergocalciferol (ERGOCALCIFEROL) 50,000 unit Cap Take 1 capsule by mouth every 7 days.      levothyroxine (SYNTHROID) 125 MCG tablet Take 1 tablet by mouth once daily.      loperamide (IMODIUM) 2 mg capsule Take 2 mg by mouth 2 (two) times daily as needed.      metoprolol tartrate (LOPRESSOR) 25 MG tablet Take 1 tablet by mouth once daily.      multivitamin,tx-iron-minerals (COMPLETE MULTIVITAMIN) Tab morning      omeprazole (PRILOSEC) 40 MG capsule Take 1 capsule by mouth once daily.      predniSONE (DELTASONE) 10 MG tablet Take by mouth.      STIOLTO RESPIMAT 2.5-2.5 mcg/actuation Mist Take 2 puffs by mouth once daily.      sucralfate (CARAFATE) 1 gram tablet Take 1 g by mouth once daily.       No current facility-administered medications for this visit.       REVIEW OF SYSTEMS:    GENERAL:  No weight loss, malaise or fevers.  HEENT:  Negative for frequent or significant headaches.  NECK:   Negative for lumps, goiter, pain and significant neck swelling.  RESPIRATORY:  Negative for cough, wheezing or shortness of breath.  CARDIOVASCULAR:  Negative for chest pain, leg swelling or palpitations.  GI:  Negative for abdominal discomfort, blood in stools or black stools or change in bowel habits.  MUSCULOSKELETAL:  + Lower back pain  SKIN:  Negative for lesions, rash, and itching.  PSYCH:  Negative for sleep disturbance, mood disorder and recent psychosocial stressors.  HEMATOLOGY/LYMPHOLOGY:  Negative for prolonged bleeding, bruising easily or swollen nodes.  NEURO:   No history of headaches, syncope, paralysis, seizures or tremors.  All other reviewed and negative other than HPI.    Past Medical History:  Past Medical History:   Diagnosis Date    A-fib     Acid reflux     Agitation     Onofre's esophagus     Chronic back pain     COPD (chronic obstructive pulmonary disease)     Depression     GERD (gastroesophageal reflux disease)     Graves disease     Heart disease     History of stomach ulcers     Hyperlipidemia     Impairment of cognitive function     Memory loss     Migraines     Mood disorder     PTSD (post-traumatic stress disorder)     Seizures     Substance abuse     TBI (traumatic brain injury)        Past Surgical History:  Past Surgical History:   Procedure Laterality Date    EPIDURAL STEROID INJECTION N/A 1/21/2022    Procedure: INJECTION, STEROID, EPIDURAL L4/5 NEEDS CONSENT;  Surgeon: Devang Jean MD;  Location: [facility];  Service: Pain Management;  Laterality: N/A;       Family History:  History reviewed. No pertinent family history.    Social History:  Social History     Socioeconomic History    Marital status:    Tobacco Use    Smoking status: Current Every Day Smoker     Types: Cigarettes    Smokeless tobacco: Never Used       OBJECTIVE:    /85 (BP Location: Right arm, Patient Position: Sitting, BP Method: Large (Automatic))   Pulse 64    "Temp 98.3 °F (36.8 °C) (Oral)   Resp 18   Ht 6' 2" (1.88 m)   Wt 114.8 kg (253 lb)   SpO2 96%   BMI 32.48 kg/m²     PHYSICAL EXAMINATION:    General appearance: Well appearing, in no acute distress, alert and oriented x3.  Psych:  Mood and affect appropriate.  Skin: Skin color, texture, turgor normal, no rashes or lesions, in both upper and lower body.  Head/face:  Atraumatic, normocephalic. No palpable lymph nodes  Neck: No pain to palpation over the cervical paraspinous muscles. Spurling Negative. No pain with neck flexion, extension, or lateral flexion. .  Cor: RRR  Pulm: CTA  GI: Abdomen soft and non-tender.  Back: Straight leg raising in the sitting and supine positions is negative to radicular pain. Tenderness to palpation over bilateral lumbar prespinal musles with positive facet loading.   Extremities: Peripheral joint ROM is full and pain free without obvious instability or laxity in all four extremities. No deformities, edema, or skin discoloration. Good capillary refill.  Musculoskeletal: Shoulder, hip, sacroiliac and knee provocative maneuvers are negative. Bilateral upper and lower extremity strength is normal and symmetric.  No atrophy or tone abnormalities are noted.  Neuro: Bilateral upper and lower extremity coordination and muscle stretch reflexes are physiologic and symmetric.  Plantar response are downgoing. No loss of sensation is noted.  Gait: Normal.    ASSESSMENT: 56 y.o. year old male with lumbar spondylosis.Patient has failed physical therapy, medication management and reports no relief from LESI. We will schedule him for bilateral L3, L4 and L5 MBB x2. If he responds appropriately to diagnostic blocks, we will consider bilateral lumbar medical branch RFA.       1. Lumbar spondylosis  Ambulatory referral/consult to Physical/Occupational Therapy    Procedure Order to Pain Management   2. Chronic pain disorder     3. DDD (degenerative disc disease), lumbar           PLAN:     - I have " stressed the importance of physical activity and a home exercise plan to help with pain and improve health.  - Referral to Physical therapy for Lumbar stabilization, core strengthening, and a home exercise program.  - Schedule for BL lumbar MBBat L3, L4 and L5 x2  - RTC in 2 weeks after the procedures.   - Counseled patient regarding the importance of activity modification and physical therapy.    The above plan and management options were discussed at length with patient. Patient is in agreement with the above and verbalized understanding.    Devang Jean  02/21/2022

## 2022-02-21 NOTE — PROGRESS NOTES
Chronic patient Established Note (Follow up visit)     Interval History 2/21/2022:    Binh Garvin presents to the clinic for a follow-up appointment for chronic lower back pain with no radiation. Since the last visit, Binh Garvin states the pain has been persistant. Current pain intensity is 7/10. He reports no relief from LESI.       HPI:    Binh Garvin presents to the clinic for the evaluation of lower back pain. The pain started over 20 years ago following broken back in college and symptoms have been worsening.The pain is located in the lower back area. Reports that he broke his back over 20 years ago after which he wore a corset for many weeks and he did not need surgery. The pain is described as aching, numbing and sharp and is rated as 4/10. The pain is rated with a score of  2/10 on the BEST day and a score of 9/10 on the WORST day.  Symptoms interfere with daily activity and work. The pain is exacerbated by Sitting, Standing, Bending, Eating, Walking, Night Time, Morning and Lifting. Pain centers to the lumbar region and does not radiate to the hips. The pain is mitigated by heat, massage, medications, physical therapy, rest and sitting. He was getting injections in and around his spine in 1761-0627. At this time and in 2021 he attempted PT but was told to stop by a physician last year and he admits that it was not providing benefit. He has been out of work for a year 2/2 to epilepsy for which he was evaluated at the [facility]. There an MRI spine was done.      He reports spending 10 hours per day reclining. The patient reports 2 hours of uninterrupted sleep per night.      Patient reports bowel incontinence but this is after starting new seizure medications. Denies urinary incontinence.     Physical Therapy/Home Exercise: yes     Pain Disability Index Review:  Last 3 PDI Scores 2/21/2022 1/11/2022   Pain Disability Index (PDI) 6 49       Pain Medications:  Tylenol, Lyrica (past), Gabapentin  (past)      Opioid Contract: not applicable     report:  Not applicable    Pain Procedures:     2022: Lumbar Interlaminar Epidural Steroid Injection L4/L5 under Fluoroscopic Guidance with no relief.    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      busPIRone (BUSPAR) 15 MG tablet 15mg qam 30mg qhs      divalproex (DEPAKOTE) 250 MG EC tablet Take 3 tablets by mouth 2 (two) times a day.      ergocalciferol (ERGOCALCIFEROL) 50,000 unit Cap Take 1 capsule by mouth every 7 days.      levothyroxine (SYNTHROID) 125 MCG tablet Take 1 tablet by mouth once daily.      loperamide (IMODIUM) 2 mg capsule Take 2 mg by mouth 2 (two) times daily as needed.      metoprolol tartrate (LOPRESSOR) 25 MG tablet Take 1 tablet by mouth once daily.      multivitamin,tx-iron-minerals (COMPLETE MULTIVITAMIN) Tab morning      omeprazole (PRILOSEC) 40 MG capsule Take 1 capsule by mouth once daily.      predniSONE (DELTASONE) 10 MG tablet Take by mouth.      STIOLTO RESPIMAT 2.5-2.5 mcg/actuation Mist Take 2 puffs by mouth once daily.      sucralfate (CARAFATE) 1 gram tablet Take 1 g by mouth once daily.       No current facility-administered medications for this visit.       REVIEW OF SYSTEMS:    GENERAL:  No weight loss, malaise or fevers.  HEENT:  Negative for frequent or significant headaches.  NECK:   Negative for lumps, goiter, pain and significant neck swelling.  RESPIRATORY:  Negative for cough, wheezing or shortness of breath.  CARDIOVASCULAR:  Negative for chest pain, leg swelling or palpitations.  GI:  Negative for abdominal discomfort, blood in stools or black stools or change in bowel habits.  MUSCULOSKELETAL:  + Lower back pain  SKIN:  Negative for lesions, rash, and itching.  PSYCH:  Negative for sleep disturbance, mood disorder and recent psychosocial stressors.  HEMATOLOGY/LYMPHOLOGY:  Negative for prolonged bleeding, bruising easily or swollen nodes.  NEURO:   No history of headaches, syncope, paralysis, seizures or tremors.  All other reviewed and negative other than HPI.    Past Medical History:  Past Medical History:   Diagnosis Date    A-fib     Acid reflux     Agitation     Onofer's esophagus     Chronic back pain     COPD (chronic obstructive pulmonary disease)     Depression     GERD (gastroesophageal reflux disease)     Graves disease     Heart disease     History of stomach ulcers     Hyperlipidemia     Impairment of cognitive function     Memory loss     Migraines     Mood disorder     PTSD (post-traumatic stress disorder)     Seizures     Substance abuse     TBI (traumatic brain injury)        Past Surgical History:  Past Surgical History:   Procedure Laterality Date    EPIDURAL STEROID INJECTION N/A 1/21/2022    Procedure: INJECTION, STEROID, EPIDURAL L4/5 NEEDS CONSENT;  Surgeon: Devang Jean MD;  Location: [facility], [place], operated by [facility];  Service: Pain Management;  Laterality: N/A;       Family History:  History reviewed. No pertinent family history.    Social History:  Social History     Socioeconomic History    Marital status:    Tobacco Use    Smoking status: Current Every Day Smoker     Types: Cigarettes    Smokeless tobacco: Never Used       OBJECTIVE:    /85 (BP Location: Right arm, Patient Position: Sitting, BP Method: Large (Automatic))   Pulse 64    "Temp 98.3 °F (36.8 °C) (Oral)   Resp 18   Ht 6' 2" (1.88 m)   Wt 114.8 kg (253 lb)   SpO2 96%   BMI 32.48 kg/m²     PHYSICAL EXAMINATION:    General appearance: Well appearing, in no acute distress, alert and oriented x3.  Psych:  Mood and affect appropriate.  Skin: Skin color, texture, turgor normal, no rashes or lesions, in both upper and lower body.  Head/face:  Atraumatic, normocephalic. No palpable lymph nodes  Neck: No pain to palpation over the cervical paraspinous muscles. Spurling Negative. No pain with neck flexion, extension, or lateral flexion. .  Cor: RRR  Pulm: CTA  GI: Abdomen soft and non-tender.  Back: Straight leg raising in the sitting and supine positions is negative to radicular pain. Tenderness to palpation over bilateral lumbar prespinal musles with positive facet loading.   Extremities: Peripheral joint ROM is full and pain free without obvious instability or laxity in all four extremities. No deformities, edema, or skin discoloration. Good capillary refill.  Musculoskeletal: Shoulder, hip, sacroiliac and knee provocative maneuvers are negative. Bilateral upper and lower extremity strength is normal and symmetric.  No atrophy or tone abnormalities are noted.  Neuro: Bilateral upper and lower extremity coordination and muscle stretch reflexes are physiologic and symmetric.  Plantar response are downgoing. No loss of sensation is noted.  Gait: Normal.    ASSESSMENT: 56 y.o. year old male with lumbar spondylosis.Patient has failed physical therapy, medication management and reports no relief from LESI. We will schedule him for bilateral L3, L4 and L5 MBB x2. If he responds appropriately to diagnostic blocks, we will consider bilateral lumbar medical branch RFA.       1. Lumbar spondylosis  Ambulatory referral/consult to Physical/Occupational Therapy    Procedure Order to Pain Management   2. Chronic pain disorder     3. DDD (degenerative disc disease), lumbar           PLAN:     - I have " stressed the importance of physical activity and a home exercise plan to help with pain and improve health.  - Referral to Physical therapy for Lumbar stabilization, core strengthening, and a home exercise program.  - Schedule for BL lumbar MBBat L3, L4 and L5 x2  - RTC in 2 weeks after the procedures.   - Counseled patient regarding the importance of activity modification and physical therapy.    The above plan and management options were discussed at length with patient. Patient is in agreement with the above and verbalized understanding.    Devang Jean  02/21/2022

## 2022-03-04 ENCOUNTER — HOSPITAL ENCOUNTER (OUTPATIENT)
Facility: OTHER | Age: 57
Discharge: HOME | End: 2022-03-04
Attending: ANESTHESIOLOGY | Admitting: ANESTHESIOLOGY
Payer: COMMERCIAL

## 2022-03-04 VITALS
DIASTOLIC BLOOD PRESSURE: 86 MMHG | SYSTOLIC BLOOD PRESSURE: 130 MMHG | OXYGEN SATURATION: 96 % | RESPIRATION RATE: 16 BRPM | WEIGHT: 250 LBS | TEMPERATURE: 98 F | HEART RATE: 58 BPM | HEIGHT: 74 IN | BODY MASS INDEX: 32.08 KG/M2

## 2022-03-04 DIAGNOSIS — M54.17 LUMBOSACRAL RADICULOPATHY: ICD-10-CM

## 2022-03-04 DIAGNOSIS — G89.29 CHRONIC PAIN: ICD-10-CM

## 2022-03-04 DIAGNOSIS — M51.36 DDD (DEGENERATIVE DISC DISEASE), LUMBAR: Primary | ICD-10-CM

## 2022-03-04 PROCEDURE — 25000003 PHARM REV CODE 250: Performed by: ANESTHESIOLOGY

## 2022-03-04 PROCEDURE — 64494 INJ PARAVERT F JNT L/S 2 LEV: CPT | Mod: 50,KX,, | Performed by: ANESTHESIOLOGY

## 2022-03-04 PROCEDURE — 64493 PR INJ DX/THER AGNT PARAVERT FACET JOINT,IMG GUIDE,LUMBAR/SAC,1ST LVL: ICD-10-PCS | Mod: 50,KX,, | Performed by: ANESTHESIOLOGY

## 2022-03-04 PROCEDURE — 64494 PR INJ DX/THER AGNT PARAVERT FACET JOINT,IMG GUIDE,LUMBAR/SAC, 2ND LEVEL: ICD-10-PCS | Mod: 50,KX,, | Performed by: ANESTHESIOLOGY

## 2022-03-04 PROCEDURE — 64493 INJ PARAVERT F JNT L/S 1 LEV: CPT | Mod: 50,KX,, | Performed by: ANESTHESIOLOGY

## 2022-03-04 PROCEDURE — 64493 INJ PARAVERT F JNT L/S 1 LEV: CPT | Mod: 50,KX | Performed by: ANESTHESIOLOGY

## 2022-03-04 PROCEDURE — 64494 INJ PARAVERT F JNT L/S 2 LEV: CPT | Mod: 50,KX | Performed by: ANESTHESIOLOGY

## 2022-03-04 PROCEDURE — 25500020 PHARM REV CODE 255: Performed by: ANESTHESIOLOGY

## 2022-03-04 RX ORDER — BUPIVACAINE HYDROCHLORIDE 2.5 MG/ML
INJECTION, SOLUTION EPIDURAL; INFILTRATION; INTRACAUDAL
Status: DISCONTINUED | OUTPATIENT
Start: 2022-03-04 | End: 2022-03-04 | Stop reason: HOSPADM

## 2022-03-04 RX ORDER — LIDOCAINE HYDROCHLORIDE 20 MG/ML
INJECTION, SOLUTION INFILTRATION; PERINEURAL
Status: DISCONTINUED | OUTPATIENT
Start: 2022-03-04 | End: 2022-03-04 | Stop reason: HOSPADM

## 2022-03-04 RX ORDER — SODIUM CHLORIDE 9 MG/ML
500 INJECTION, SOLUTION INTRAVENOUS CONTINUOUS
Status: DISCONTINUED | OUTPATIENT
Start: 2022-03-04 | End: 2022-03-04 | Stop reason: HOSPADM

## 2022-03-04 RX ORDER — ALPRAZOLAM 0.5 MG/1
1 TABLET ORAL ONCE
Status: COMPLETED | OUTPATIENT
Start: 2022-03-04 | End: 2022-03-04

## 2022-03-04 RX ADMIN — ALPRAZOLAM 1 MG: 0.5 TABLET ORAL at 10:03

## 2022-03-04 NOTE — DISCHARGE SUMMARY
Discharge Note  Short Stay      SUMMARY     Admit Date: 3/4/2022    Attending Physician: Devang Jean      Discharge Physician: Devang Jean      Discharge Date: 3/4/2022 11:15 AM    Procedure(s) (LRB):  BLOCK, NERVE BILATERAL MEDIAL BRANCH L3,L4,L5 1st, needs consent (Bilateral)    Final Diagnosis: Lumbar spondylosis [M47.816]    Disposition: Home or self care    Patient Instructions:   Current Discharge Medication List      CONTINUE these medications which have NOT CHANGED    Details   acetaminophen (TYLENOL) 500 MG tablet 1,000 mg in morning and night      atorvastatin (LIPITOR) 20 MG tablet Take 1 tablet by mouth every evening.      busPIRone (BUSPAR) 15 MG tablet 15mg qam 30mg qhs      divalproex (DEPAKOTE) 250 MG EC tablet Take 3 tablets by mouth 2 (two) times a day.      ergocalciferol (ERGOCALCIFEROL) 50,000 unit Cap Take 1 capsule by mouth every 7 days.      levothyroxine (SYNTHROID) 125 MCG tablet Take 1 tablet by mouth once daily.      loperamide (IMODIUM) 2 mg capsule Take 2 mg by mouth 2 (two) times daily as needed.      metoprolol tartrate (LOPRESSOR) 25 MG tablet Take 1 tablet by mouth once daily.      multivitamin,tx-iron-minerals (COMPLETE MULTIVITAMIN) Tab morning      omeprazole (PRILOSEC) 40 MG capsule Take 1 capsule by mouth once daily.      predniSONE (DELTASONE) 10 MG tablet Take by mouth.      STIOLTO RESPIMAT 2.5-2.5 mcg/actuation Mist Take 2 puffs by mouth once daily.      sucralfate (CARAFATE) 1 gram tablet Take 1 g by mouth once daily.                 Discharge Diagnosis: Lumbar spondylosis [M47.816]  Condition on Discharge: Stable with no complications to procedure   Diet on Discharge: Same as before.  Activity: as per instruction sheet.  Discharge to: Home with a responsible adult.  Follow up: 2-4 weeks

## 2022-03-04 NOTE — H&P
"HPI  Pt is a 56 y.o. male w/hx Lumbar spondylosis [M47.816] who presents for:    BLOCK, NERVE BILATERAL MEDIAL BRANCH L3,L4,L5 1st, needs consent:     He reports that they are in their usual state of health.  Pain is 5/10 today.  Denies recent infection or abx.  Denies skin change/rash/infection at/near site of procedure.  All questions answered.  No new complaints or concerns.          Past Medical History:   Diagnosis Date    A-fib     Acid reflux     Agitation     Onofre's esophagus     Chronic back pain     COPD (chronic obstructive pulmonary disease)     Depression     GERD (gastroesophageal reflux disease)     Graves disease     Heart disease     History of stomach ulcers     Hyperlipidemia     Impairment of cognitive function     Memory loss     Migraines     Mood disorder     PTSD (post-traumatic stress disorder)     Seizures     Substance abuse     TBI (traumatic brain injury)      Past Surgical History:   Procedure Laterality Date    EPIDURAL STEROID INJECTION N/A 1/21/2022    Procedure: INJECTION, STEROID, EPIDURAL L4/5 NEEDS CONSENT;  Surgeon: Devang Jean MD;  Location: [facility];  Service: Pain Management;  Laterality: N/A;     Review of patient's allergies indicates:   Allergen Reactions    Celecoxib Diarrhea    Nsaids (non-steroidal anti-inflammatory drug) Other (See Comments)    Lacosamide Anxiety, Hallucinations and Nausea And Vomiting    Levetiracetam Anxiety        PMHx, PSHx, Allergies, Medications reviewed in epic      ROS negative except pain complaints in HPI    OBJECTIVE:    /85 (BP Location: Right arm, Patient Position: Sitting)   Pulse 65   Temp 98.2 °F (36.8 °C) (Oral)   Resp 18   Ht 6' 2" (1.88 m)   Wt 113.4 kg (250 lb)   SpO2 97%   BMI 32.10 kg/m²     PHYSICAL EXAMINATION:    GENERAL: Well appearing, in no acute distress, alert and oriented x3.  PSYCH:  Mood and affect appropriate.  SKIN: Skin color, texture, turgor normal, no rashes or " lesions.  CV: RRR with palpation of the radial artery.  PULM: No evidence of respiratory difficulty, symmetric chest rise. Clear to auscultation.  NEURO: Cranial nerves grossly intact.    Plan:    Proceed with procedure as planned    Dani Zaidi  03/04/2022

## 2022-03-04 NOTE — PLAN OF CARE
Pt tolerated procedure well. Pt reports 4/10 pain after procedure. Wife at bedside.  Assisted pt up for first time. Steady on feet. All discharge instructions given.  Pain diary given.   Single

## 2022-03-04 NOTE — OP NOTE
Diagnostic Lumbar Medial Branch Block Under Fluoroscopy    The procedure, risks, benefits, and options were discussed with the patient. There are no contraindications to the procedure. The patent expressed understanding and agreed to the procedure. Informed written consent was obtained prior to the start of the procedure and can be found in the patient's chart.    PATIENT NAME: Binh Garvin   MRN: [id number]     DATE OF PROCEDURE: 03/04/2022                                           PROCEDURE:  Diagnostic Bilateral L3, L4 and L5 Lumbar Medial Branch Block under Fluoroscopy    PRE-OP DIAGNOSIS: Lumbar spondylosis [M47.816]    POST-OP DIAGNOSIS: Same    PHYSICIAN: Devang Jean MD    ASSISTANTS: Dr. Harris    MEDICATIONS INJECTED:  Bupivicaine 0.25%    LOCAL ANESTHETIC INJECTED:   Xylocaine 2%    SEDATION: None    ESTIMATED BLOOD LOSS:  None    COMPLICATIONS:  None.    INTERVAL HISTORY: Patient has clinical and imaging findings suggestive of facet mediated pain.    TECHNIQUE: Time-out was performed to identify the patient and procedure to be performed. With the patient laying in a prone position, the surgical area was prepped and draped in the usual sterile fashion using ChloraPrep and fenestrated drape. The levels were determined under fluoroscopic guidance. Skin anesthesia was achieved by injecting Lidocaine 2% over the injection sites. A 25 gauge, 3.5 inch needle was introduced into the medial branch nerves at the junctions of the superior articular process and the transverse processes of the targeted sites using AP, lateral and/or contralateral oblique fluoroscopic imaging. After negative aspiration for blood or CSF was confirmed, 1 mL of the anesthetic listed above was then slowly injected at each site. The needles were removed and bleeding was nil. A sterile dressing was applied. No specimens collected. The patient tolerated the procedure well.     The patient was monitored after the procedure in the recovery area.  They were given post-procedure and discharge instructions to follow at home. The patient was discharged in a stable condition.    I reviewed and edited the fellow's note. I conducted my own interview and physical examination. I agree with the findings. I was present and supervising all critical portions of the procedure.    Devang Jean MD

## 2022-03-11 ENCOUNTER — HOSPITAL ENCOUNTER (OUTPATIENT)
Facility: OTHER | Age: 57
Discharge: HOME | End: 2022-03-11
Attending: ANESTHESIOLOGY | Admitting: ANESTHESIOLOGY
Payer: COMMERCIAL

## 2022-03-11 VITALS
DIASTOLIC BLOOD PRESSURE: 85 MMHG | OXYGEN SATURATION: 96 % | HEIGHT: 74 IN | WEIGHT: 250 LBS | SYSTOLIC BLOOD PRESSURE: 112 MMHG | TEMPERATURE: 98 F | RESPIRATION RATE: 16 BRPM | HEART RATE: 60 BPM | BODY MASS INDEX: 32.08 KG/M2

## 2022-03-11 DIAGNOSIS — M47.816 LUMBAR SPONDYLOSIS: Primary | ICD-10-CM

## 2022-03-11 DIAGNOSIS — G89.29 CHRONIC PAIN: ICD-10-CM

## 2022-03-11 PROCEDURE — 64494 PR INJ DX/THER AGNT PARAVERT FACET JOINT,IMG GUIDE,LUMBAR/SAC, 2ND LEVEL: ICD-10-PCS | Mod: 50,KX,, | Performed by: ANESTHESIOLOGY

## 2022-03-11 PROCEDURE — 25000003 PHARM REV CODE 250: Performed by: STUDENT IN AN ORGANIZED HEALTH CARE EDUCATION/TRAINING PROGRAM

## 2022-03-11 PROCEDURE — 64494 INJ PARAVERT F JNT L/S 2 LEV: CPT | Mod: 50,KX | Performed by: ANESTHESIOLOGY

## 2022-03-11 PROCEDURE — 25500020 PHARM REV CODE 255: Performed by: ANESTHESIOLOGY

## 2022-03-11 PROCEDURE — 64494 INJ PARAVERT F JNT L/S 2 LEV: CPT | Mod: 50,KX,, | Performed by: ANESTHESIOLOGY

## 2022-03-11 PROCEDURE — 64493 INJ PARAVERT F JNT L/S 1 LEV: CPT | Mod: 50,KX | Performed by: ANESTHESIOLOGY

## 2022-03-11 PROCEDURE — 64493 INJ PARAVERT F JNT L/S 1 LEV: CPT | Mod: 50,KX,, | Performed by: ANESTHESIOLOGY

## 2022-03-11 PROCEDURE — 25000003 PHARM REV CODE 250: Performed by: ANESTHESIOLOGY

## 2022-03-11 PROCEDURE — 64493 PR INJ DX/THER AGNT PARAVERT FACET JOINT,IMG GUIDE,LUMBAR/SAC,1ST LVL: ICD-10-PCS | Mod: 50,KX,, | Performed by: ANESTHESIOLOGY

## 2022-03-11 RX ORDER — LIDOCAINE HYDROCHLORIDE 20 MG/ML
INJECTION, SOLUTION INFILTRATION; PERINEURAL
Status: DISCONTINUED | OUTPATIENT
Start: 2022-03-11 | End: 2022-03-11 | Stop reason: HOSPADM

## 2022-03-11 RX ORDER — BUPIVACAINE HYDROCHLORIDE 2.5 MG/ML
INJECTION, SOLUTION EPIDURAL; INFILTRATION; INTRACAUDAL
Status: DISCONTINUED | OUTPATIENT
Start: 2022-03-11 | End: 2022-03-11 | Stop reason: HOSPADM

## 2022-03-11 RX ORDER — ALPRAZOLAM 0.5 MG/1
0.5 TABLET ORAL
Status: DISCONTINUED | OUTPATIENT
Start: 2022-03-11 | End: 2022-03-11 | Stop reason: HOSPADM

## 2022-03-11 RX ADMIN — ALPRAZOLAM 1 MG: 0.5 TABLET ORAL at 10:03

## 2022-03-11 NOTE — OP NOTE
Diagnostic Lumbar Medial Branch Block Under Fluoroscopy    The procedure, risks, benefits, and options were discussed with the patient. There are no contraindications to the procedure. The patent expressed understanding and agreed to the procedure. Informed written consent was obtained prior to the start of the procedure and can be found in the patient's chart.    PATIENT NAME: Binh Garvin   MRN: [id number]     DATE OF PROCEDURE: 03/11/2022                                           PROCEDURE:  Diagnostic Bilateral L3, L4 and L5 Lumbar Medial Branch Block under Fluoroscopy    PRE-OP DIAGNOSIS: Lumbar spondylosis [M47.816]    POST-OP DIAGNOSIS: Same    PHYSICIAN: Devang Jean MD    ASSISTANTS: Dr. Barboza    MEDICATIONS INJECTED:  Bupivicaine 0.25%    LOCAL ANESTHETIC INJECTED:   Xylocaine 2%    SEDATION: None    ESTIMATED BLOOD LOSS:  None    COMPLICATIONS:  None.    INTERVAL HISTORY: Patient has clinical and imaging findings suggestive of facet mediated pain. Patient had a previous diagnostic block performed with at least 80% relief in pain and/or at least 50% improvement in the ability to perform previously painful movements and ADLs for the expected duration of the local anesthetic utilized.    TECHNIQUE: Time-out was performed to identify the patient and procedure to be performed. With the patient laying in a prone position, the surgical area was prepped and draped in the usual sterile fashion using ChloraPrep and fenestrated drape. The levels were determined under fluoroscopic guidance. Skin anesthesia was achieved by injecting Lidocaine 2% over the injection sites. A 22 gauge, 5 inch needle was introduced into the medial branch nerves at the junctions of the superior articular process and the transverse processes of the targeted sites using AP, lateral and/or contralateral oblique fluoroscopic imaging. After negative aspiration for blood or CSF was confirmed, 1 mL of the anesthetic listed above was then slowly  injected at each site. The needles were removed and bleeding was nil. A sterile dressing was applied. No specimens collected. The patient tolerated the procedure well.     The patient was monitored after the procedure in the recovery area. They were given post-procedure and discharge instructions to follow at home. The patient was discharged in a stable condition.    I reviewed and edited the fellow's note. I conducted my own interview and physical examination. I agree with the findings. I was present and supervising all critical portions of the procedure.    Devang Jean MD

## 2022-03-11 NOTE — INTERVAL H&P NOTE
The patient has been examined and the H&P has been reviewed:    I concur with the findings and no changes have occurred since H&P was written.    Procedure risks, benefits and alternative options discussed and understood by patient/family.      There are no hospital problems to display for this patient.

## 2022-03-11 NOTE — DISCHARGE INSTRUCTIONS

## 2022-03-11 NOTE — DISCHARGE SUMMARY
Discharge Note  Short Stay      SUMMARY     Admit Date: 3/11/2022    Attending Physician: Devang Jean      Discharge Physician: Devang Jean      Discharge Date: 3/11/2022 11:31 AM    Procedure(s) (LRB):  BLOCK, NERVE MEDIAL BRANCH  BILATERAL L3,L4,L5 2nd, needs consent (Bilateral)    Final Diagnosis: Lumbar spondylosis [M47.816]    Disposition: Home or self care    Patient Instructions:   Current Discharge Medication List      CONTINUE these medications which have NOT CHANGED    Details   acetaminophen (TYLENOL) 500 MG tablet 1,000 mg in morning and night      atorvastatin (LIPITOR) 20 MG tablet Take 1 tablet by mouth every evening.      busPIRone (BUSPAR) 15 MG tablet 15mg qam 30mg qhs      divalproex (DEPAKOTE) 250 MG EC tablet Take 3 tablets by mouth 2 (two) times a day.      ergocalciferol (ERGOCALCIFEROL) 50,000 unit Cap Take 1 capsule by mouth every 7 days.      levothyroxine (SYNTHROID) 125 MCG tablet Take 1 tablet by mouth once daily.      loperamide (IMODIUM) 2 mg capsule Take 2 mg by mouth 2 (two) times daily as needed.      metoprolol tartrate (LOPRESSOR) 25 MG tablet Take 1 tablet by mouth once daily.      multivitamin,tx-iron-minerals (COMPLETE MULTIVITAMIN) Tab morning      omeprazole (PRILOSEC) 40 MG capsule Take 1 capsule by mouth once daily.      predniSONE (DELTASONE) 10 MG tablet Take by mouth.      STIOLTO RESPIMAT 2.5-2.5 mcg/actuation Mist Take 2 puffs by mouth once daily.      sucralfate (CARAFATE) 1 gram tablet Take 1 g by mouth once daily.                 Discharge Diagnosis: Lumbar spondylosis [M47.816]  Condition on Discharge: Stable with no complications to procedure   Diet on Discharge: Same as before.  Activity: as per instruction sheet.  Discharge to: Home with a responsible adult.  Follow up: 2-4 weeks

## 2022-03-14 ENCOUNTER — PATIENT MESSAGE (OUTPATIENT)
Dept: PAIN MEDICINE | Facility: OTHER | Age: 57
End: 2022-03-14
Payer: COMMERCIAL

## 2022-03-14 ENCOUNTER — TELEPHONE (OUTPATIENT)
Dept: PAIN MEDICINE | Facility: CLINIC | Age: 57
End: 2022-03-14
Payer: COMMERCIAL

## 2022-03-14 DIAGNOSIS — M47.812 CERVICAL SPONDYLOSIS: Primary | ICD-10-CM

## 2022-03-14 DIAGNOSIS — M47.816 LUMBAR SPONDYLOSIS: ICD-10-CM

## 2022-03-14 NOTE — TELEPHONE ENCOUNTER
----- Message from Shameka Zaidi sent at 3/14/2022  1:38 PM CDT -----  Who called?:PT Mom(Nisreen)      What is the request in detail:PT mom would like to speak with staff about going over results please advise        Can the clinic reply by [facility]?:Yes        Best Call Back Number: [phone]

## 2022-03-14 NOTE — TELEPHONE ENCOUNTER
Mojgan Almeida MD 16 minutes ago (1:55 PM)            Addended by: MOJGAN ALMEIDA on: 3/14/2022 01:55 PM       Modules accepted: Orders           Documentation      Mojgan Almeida MD  You 17 minutes ago (1:54 PM)         Please schedule for right and then left  L3, L4 and L5 Lumbar Medial Branch RFA    Message text       You  Mojgan Almeida MD 23 minutes ago (1:48 PM)     AW    Spoke with patient he stated he received 90% out of 100% relief from 3/11/22 BLOCK, NERVE MEDIAL BRANCH  BILATERAL L3,L4,       2nd Block    Routing comment      You  Binh Garvin 25 minutes ago (1:47 PM)     You 25 minutes ago (1:46 PM)     AW       ----- Message from Shameka Zaidi sent at 3/14/2022  1:38 PM CDT -----  Who called?:PT Mom(Nisreen)        What is the request in detail:PT mom would like to speak with staff about going over results please advise           Can the clinic reply by [facility]?:Yes           Best Call Back Number: [phone]

## 2022-03-16 ENCOUNTER — PATIENT MESSAGE (OUTPATIENT)
Dept: PAIN MEDICINE | Facility: OTHER | Age: 57
End: 2022-03-16
Payer: COMMERCIAL

## 2022-03-24 ENCOUNTER — PATIENT MESSAGE (OUTPATIENT)
Dept: ENDOCRINOLOGY | Facility: CLINIC | Age: 57
End: 2022-03-24

## 2022-03-24 ENCOUNTER — HOSPITAL ENCOUNTER (OUTPATIENT)
Facility: OTHER | Age: 57
Discharge: HOME | End: 2022-03-24
Attending: ANESTHESIOLOGY | Admitting: ANESTHESIOLOGY
Payer: COMMERCIAL

## 2022-03-24 ENCOUNTER — OFFICE VISIT (OUTPATIENT)
Dept: ENDOCRINOLOGY | Facility: CLINIC | Age: 57
End: 2022-03-24
Payer: COMMERCIAL

## 2022-03-24 ENCOUNTER — PATIENT MESSAGE (OUTPATIENT)
Dept: PAIN MEDICINE | Facility: OTHER | Age: 57
End: 2022-03-24
Payer: COMMERCIAL

## 2022-03-24 VITALS
HEIGHT: 74 IN | OXYGEN SATURATION: 94 % | DIASTOLIC BLOOD PRESSURE: 74 MMHG | HEART RATE: 62 BPM | WEIGHT: 250 LBS | SYSTOLIC BLOOD PRESSURE: 118 MMHG | BODY MASS INDEX: 32.08 KG/M2

## 2022-03-24 VITALS
WEIGHT: 250 LBS | HEIGHT: 74 IN | OXYGEN SATURATION: 94 % | BODY MASS INDEX: 32.08 KG/M2 | TEMPERATURE: 98 F | DIASTOLIC BLOOD PRESSURE: 67 MMHG | SYSTOLIC BLOOD PRESSURE: 106 MMHG | RESPIRATION RATE: 16 BRPM | HEART RATE: 64 BPM

## 2022-03-24 DIAGNOSIS — E89.0 HYPOTHYROIDISM, POSTABLATIVE: Primary | ICD-10-CM

## 2022-03-24 DIAGNOSIS — E89.0 HYPOTHYROIDISM, POSTABLATIVE: ICD-10-CM

## 2022-03-24 DIAGNOSIS — E55.9 VITAMIN D DEFICIENCY: ICD-10-CM

## 2022-03-24 DIAGNOSIS — M47.816 SPONDYLOSIS WITHOUT MYELOPATHY OR RADICULOPATHY, LUMBAR REGION: Primary | ICD-10-CM

## 2022-03-24 DIAGNOSIS — E05.00 GRAVES DISEASE: Primary | ICD-10-CM

## 2022-03-24 PROCEDURE — 64635 PR DESTROY LUMB/SAC FACET JNT: ICD-10-PCS | Mod: RT,,, | Performed by: ANESTHESIOLOGY

## 2022-03-24 PROCEDURE — 99999 PR PBB SHADOW E&M-EST. PATIENT-LVL IV: ICD-10-PCS | Mod: PBBFAC,,, | Performed by: INTERNAL MEDICINE

## 2022-03-24 PROCEDURE — 25000003 PHARM REV CODE 250: Performed by: ANESTHESIOLOGY

## 2022-03-24 PROCEDURE — 64636 DESTROY L/S FACET JNT ADDL: CPT | Mod: RT | Performed by: ANESTHESIOLOGY

## 2022-03-24 PROCEDURE — 63600175 PHARM REV CODE 636 W HCPCS: Performed by: ANESTHESIOLOGY

## 2022-03-24 PROCEDURE — 99152 MOD SED SAME PHYS/QHP 5/>YRS: CPT | Mod: ,,, | Performed by: ANESTHESIOLOGY

## 2022-03-24 PROCEDURE — 64635 DESTROY LUMB/SAC FACET JNT: CPT | Mod: RT,,, | Performed by: ANESTHESIOLOGY

## 2022-03-24 PROCEDURE — 99204 PR OFFICE/OUTPT VISIT, NEW, LEVL IV, 45-59 MIN: ICD-10-PCS | Mod: S$GLB,,, | Performed by: INTERNAL MEDICINE

## 2022-03-24 PROCEDURE — 64636 DESTROY L/S FACET JNT ADDL: CPT | Mod: RT,,, | Performed by: ANESTHESIOLOGY

## 2022-03-24 PROCEDURE — 3078F DIAST BP <80 MM HG: CPT | Mod: CPTII,S$GLB,, | Performed by: INTERNAL MEDICINE

## 2022-03-24 PROCEDURE — 3008F PR BODY MASS INDEX (BMI) DOCUMENTED: ICD-10-PCS | Mod: CPTII,S$GLB,, | Performed by: INTERNAL MEDICINE

## 2022-03-24 PROCEDURE — 99999 PR PBB SHADOW E&M-EST. PATIENT-LVL IV: CPT | Mod: PBBFAC,,, | Performed by: INTERNAL MEDICINE

## 2022-03-24 PROCEDURE — 3008F BODY MASS INDEX DOCD: CPT | Mod: CPTII,S$GLB,, | Performed by: INTERNAL MEDICINE

## 2022-03-24 PROCEDURE — 64636 PR DESTROY L/S FACET JNT ADDL: ICD-10-PCS | Mod: RT,,, | Performed by: ANESTHESIOLOGY

## 2022-03-24 PROCEDURE — 3078F PR MOST RECENT DIASTOLIC BLOOD PRESSURE < 80 MM HG: ICD-10-PCS | Mod: CPTII,S$GLB,, | Performed by: INTERNAL MEDICINE

## 2022-03-24 PROCEDURE — 3074F PR MOST RECENT SYSTOLIC BLOOD PRESSURE < 130 MM HG: ICD-10-PCS | Mod: CPTII,S$GLB,, | Performed by: INTERNAL MEDICINE

## 2022-03-24 PROCEDURE — 1160F PR REVIEW ALL MEDS BY PRESCRIBER/CLIN PHARMACIST DOCUMENTED: ICD-10-PCS | Mod: CPTII,S$GLB,, | Performed by: INTERNAL MEDICINE

## 2022-03-24 PROCEDURE — 99204 OFFICE O/P NEW MOD 45 MIN: CPT | Mod: S$GLB,,, | Performed by: INTERNAL MEDICINE

## 2022-03-24 PROCEDURE — 1160F RVW MEDS BY RX/DR IN RCRD: CPT | Mod: CPTII,S$GLB,, | Performed by: INTERNAL MEDICINE

## 2022-03-24 PROCEDURE — 1159F MED LIST DOCD IN RCRD: CPT | Mod: CPTII,S$GLB,, | Performed by: INTERNAL MEDICINE

## 2022-03-24 PROCEDURE — 99152 MOD SED SAME PHYS/QHP 5/>YRS: CPT | Performed by: ANESTHESIOLOGY

## 2022-03-24 PROCEDURE — 1159F PR MEDICATION LIST DOCUMENTED IN MEDICAL RECORD: ICD-10-PCS | Mod: CPTII,S$GLB,, | Performed by: INTERNAL MEDICINE

## 2022-03-24 PROCEDURE — 99152 PR MOD CONSCIOUS SEDATION, SAME PHYS, 5+ YRS, FIRST 15 MIN: ICD-10-PCS | Mod: ,,, | Performed by: ANESTHESIOLOGY

## 2022-03-24 PROCEDURE — 64635 DESTROY LUMB/SAC FACET JNT: CPT | Mod: RT | Performed by: ANESTHESIOLOGY

## 2022-03-24 PROCEDURE — 3074F SYST BP LT 130 MM HG: CPT | Mod: CPTII,S$GLB,, | Performed by: INTERNAL MEDICINE

## 2022-03-24 RX ORDER — MIDAZOLAM HYDROCHLORIDE 1 MG/ML
INJECTION INTRAMUSCULAR; INTRAVENOUS
Status: DISCONTINUED | OUTPATIENT
Start: 2022-03-24 | End: 2022-03-24 | Stop reason: HOSPADM

## 2022-03-24 RX ORDER — FENTANYL CITRATE 50 UG/ML
INJECTION, SOLUTION INTRAMUSCULAR; INTRAVENOUS
Status: DISCONTINUED | OUTPATIENT
Start: 2022-03-24 | End: 2022-03-24 | Stop reason: HOSPADM

## 2022-03-24 RX ORDER — LIDOCAINE HYDROCHLORIDE 10 MG/ML
INJECTION, SOLUTION EPIDURAL; INFILTRATION; INTRACAUDAL; PERINEURAL
Status: DISCONTINUED | OUTPATIENT
Start: 2022-03-24 | End: 2022-03-24 | Stop reason: HOSPADM

## 2022-03-24 RX ORDER — BUPIVACAINE HYDROCHLORIDE 2.5 MG/ML
INJECTION, SOLUTION EPIDURAL; INFILTRATION; INTRACAUDAL
Status: DISCONTINUED | OUTPATIENT
Start: 2022-03-24 | End: 2022-03-24 | Stop reason: HOSPADM

## 2022-03-24 RX ORDER — DEXAMETHASONE SODIUM PHOSPHATE 10 MG/ML
INJECTION INTRAMUSCULAR; INTRAVENOUS
Status: DISCONTINUED | OUTPATIENT
Start: 2022-03-24 | End: 2022-03-24 | Stop reason: HOSPADM

## 2022-03-24 RX ORDER — SODIUM CHLORIDE 9 MG/ML
500 INJECTION, SOLUTION INTRAVENOUS CONTINUOUS
Status: DISCONTINUED | OUTPATIENT
Start: 2022-03-24 | End: 2022-03-24 | Stop reason: HOSPADM

## 2022-03-24 NOTE — DISCHARGE INSTRUCTIONS
Thank you for allowing us to care for you today. You may receive a survey about the care we provided. Your feedback is valuable and helps us provide excellent care throughout the community.     Home Care Instructions for Pain Management:    1. DIET:   You may resume your normal diet today.   2. BATHING:   You may shower with luke warm water. No tub baths or anything that will soak injection sites under water for the next 24 hours.  3. DRESSING:   You may remove your bandage today.   4. ACTIVITY LEVEL:   You may resume your normal activities 24 hrs after your procedure. Nothing strenuous today.  5. MEDICATIONS:   You may resume your normal medications today. To restart blood thinners, ask your doctor.  6. DRIVING    If you have received any sedatives by mouth today, you may not drive for 12 hours.    If you have received any sedation through your IV, you may not drive for 24 hrs.   7. SPECIAL INSTRUCTIONS:   No heat to the injection site for 24 hrs including, hot bath or shower, heating pad, moist heat, or hot tubs.    Use ice pack to injection site for any pain or discomfort.  Apply ice packs for 20 minute intervals as needed.    IF you have diabetes, be sure to monitor your blood sugar more closely. IF your injection contained steroids your blood sugar levels may become higher than normal.    If you are still having pain upon discharge:  Your pain may improve over the next 48 hours. The anesthetic (numbing medication) works immediately to 48 hours. IF your injection contained a steroid (anti-inflammatory medication), it takes approximately 3 days to start feeling relief and 7-10 days to see your greatest results from the medication. It is possible you may need subsequent injections. This would be discussed at your follow up appointment with pain management or your referring doctor.    Please call the PAIN MANAGEMENT office at [phone] or ON CALL pager at [phone] if you experienced any:   -Weakness or  loss of sensation  -Fever > 101.5  -Pain uncontrolled with oral medications   -Persistent nausea, vomiting, or diarrhea  -Redness or drainage from the injection sites, or any other worrisome concerns.   If physician on call was not reached or could not communicate with our office for any reason please go to the nearest emergency department. Adult Procedural Sedation Instructions    Recovery After Procedural Sedation (Adult)  You have been given medicine by vein to make you sleep during your surgery. This may have included both a pain medicine and sleeping medicine. Most of the effects have worn off. But you may still have some drowsiness for the next 6 to 8 hours.  Home care  Follow these guidelines when you get home:  For the next 8 hours, you should be watched by a responsible adult. This person should make sure your condition is not getting worse.  Don't drink any alcohol for the next 24 hours.  Don't drive, operate dangerous machinery, or make important business or personal decisions during the next 24 hours.  Note: Your healthcare provider may tell you not to take any medicine by mouth for pain or sleep in the next 4 hours. These medicines may react with the medicines you were given in the hospital. This could cause a much stronger response than usual.  Follow-up care  Follow up with your healthcare provider if you are not alert and back to your usual level of activity within 12 hours.  When to seek medical advice  Call your healthcare provider right away if any of these occur:  Drowsiness gets worse  Weakness or dizziness gets worse  Repeated vomiting  You can't be awakened   Date Last Reviewed: 10/18/2016  © 3137-5171 [employer]. [address] [place] [address], [place] [address]. All rights reserved. This information is not intended as a substitute for professional medical care. Always follow your healthcare professional's instructions.

## 2022-03-24 NOTE — H&P
HPI  Patient presenting for Procedure(s) (LRB):  RADIOFREQUENCY ABLATION, L3-L4-L5 MEDIAL BRANCH 1 OF 2 (Right)     Patient on Anti-coagulation No    No health changes since previous encounter    Past Medical History:   Diagnosis Date    A-fib     Acid reflux     Agitation     Onofre's esophagus     Chronic back pain     COPD (chronic obstructive pulmonary disease)     Depression     GERD (gastroesophageal reflux disease)     Graves disease     Heart disease     History of stomach ulcers     Hyperlipidemia     Hypothyroidism     Impairment of cognitive function     Memory loss     Migraines     Mood disorder     PTSD (post-traumatic stress disorder)     Seizures     Substance abuse     TBI (traumatic brain injury)      Past Surgical History:   Procedure Laterality Date    EPIDURAL STEROID INJECTION N/A 1/21/2022    Procedure: INJECTION, STEROID, EPIDURAL L4/5 NEEDS CONSENT;  Surgeon: Devang Jean MD;  Location: [facility];  Service: Pain Management;  Laterality: N/A;    INJECTION OF ANESTHETIC AGENT AROUND NERVE Bilateral 3/4/2022    Procedure: BLOCK, NERVE BILATERAL MEDIAL BRANCH L3,L4,L5 1st, needs consent;  Surgeon: Devang Jean MD;  Location: [facility] PAIN [facility]GT;  Service: Pain Management;  Laterality: Bilateral;    INJECTION OF ANESTHETIC AGENT AROUND NERVE Bilateral 3/11/2022    Procedure: BLOCK, NERVE MEDIAL BRANCH  BILATERAL L3,L4,L5 2nd, needs consent;  Surgeon: Devang Jean MD;  Location: [facility] PAIN [facility]GT;  Service: Pain Management;  Laterality: Bilateral;     Review of patient's allergies indicates:   Allergen Reactions    Celecoxib Diarrhea    Nsaids (non-steroidal anti-inflammatory drug) Other (See Comments)    Lacosamide Anxiety, Hallucinations and Nausea And Vomiting    Levetiracetam Anxiety      Current Facility-Administered Medications   Medication    0.9%  NaCl infusion       PMHx, PSHx, Allergies, Medications reviewed in epic    ROS negative except pain  "complaints in HPI    OBJECTIVE:    /87 (BP Location: Right arm, Patient Position: Sitting)   Pulse 65   Temp 97.5 °F (36.4 °C) (Oral)   Resp 16   Ht 6' 2" (1.88 m)   Wt 113.4 kg (250 lb)   SpO2 98%   BMI 32.10 kg/m²     PHYSICAL EXAMINATION:    GENERAL: Well appearing, in no acute distress, alert and oriented x3.  PSYCH:  Mood and affect appropriate.  SKIN: Skin color, texture, turgor normal, no rashes or lesions which will impact the procedure.  CV: RRR with palpation of the radial artery.  PULM: No evidence of respiratory difficulty, symmetric chest rise. Clear to auscultation.  NEURO: Cranial nerves grossly intact.    Plan:    Proceed with procedure as planned Procedure(s) (LRB):  RADIOFREQUENCY ABLATION, L3-L4-L5 MEDIAL BRANCH 1 OF 2 (Right)    Devang Jean  03/24/2022            "

## 2022-03-24 NOTE — PROGRESS NOTES
"Subjective:      Patient ID: Binh Garvin is a 56 y.o. male.    Chief Complaint:  Consult      History of Present Illness:    Mr. Garvin presents for evaluation and management of hypothyroidism. PMH of seizures, Afib, COPD , Barrets, GERD, HLD, Cognitive impairment, Migraines. 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 He was "given a radiation pill" Referred to a Dr. Maradiaga.     The patient reports that over the last year he has gone from Hyper to Hypothyroidism. He has having to continuously increase his synthroid dosage.    He has gained a lot of weight-about 50 pounds. Cold intolerance. No hair gain or loss.     Family history of thyroid disease: mother has hypothyroidism and believes his brother is also hypothyroidism    Currently taking levothyroxine 125 mcg- told perviously "he must take brand name"     Was on Liothyronine .5 daily but removed after he was diagnosed with atrial fibrillation     TSH measured to be 5.94 at last check December 28th of 2021  Free T4 .88    He takes this with other medicines, does not take on an empty stomach or with any relationship to when he eats meals. Does endorse he has not missed doses. Does currently take Sucralfate with levothyroxine    Has tremors    No dysphagia, hoarseness or voice changes.     The patient also endorses taking Vitamin D once weekly for unclear reasons      Review of Systems   Constitutional: Positive for unexpected weight change. Negative for appetite change, chills and fever.   Gastrointestinal: Negative for nausea and vomiting.   Endocrine: Positive for cold intolerance. Negative for heat intolerance, polydipsia, polyphagia and polyuria.       Objective:   Physical Exam  Vitals and nursing note reviewed.   Constitutional:       Appearance: Normal appearance.   Neck:      " Thyroid: No thyroid mass, thyromegaly or thyroid tenderness.      Trachea: Trachea normal.   Musculoskeletal:      Cervical back: Normal range of motion and neck supple.   Lymphadenopathy:      Cervical: No cervical adenopathy.   Neurological:      Mental Status: He is alert.       BP Readings from Last 3 Encounters:   03/24/22 118/74   03/11/22 112/85   03/04/22 130/86     Wt Readings from Last 1 Encounters:   03/24/22 1035 113.4 kg (250 lb)       Body mass index is 32.1 kg/m².    Lab Review:   No results found for: HGBA1C  No results found for: CHOL, HDL, LDLCALC, TRIG, CHOLHDL  No results found for: NA, K, CL, CO2, GLU, BUN, CREATININE, CALCIUM, PROT, ALBUMIN, BILITOT, ALKPHOS, AST, ALT, ANIONGAP, ESTGFRAFRICA, EGFRNONAA, TSH      Assessment and Plan     No problem-specific Assessment & Plan notes found for this encounter.    Graves Disease    Hypothyroidism,post ablative  The Importance of medication compliance and timing was stressed. The patient has had significant TSH variabilities, but this is in the setting of improperly taking the synthroid medication, the addition of several new medications, and the taking of medications that interfere with synthroid absorption.   --Will repeat TSH today, synthroid dosage will be adjusted accordingly and medication to be refilled and sent via [facility] 90 day supply Brand Name only  --Patient to follow up in 6 to 8 weeks after the patients thyroid function status has been well established via the above plan of action    Vitamin D Deficiency  --Will measure vitamin D today and base recommendations of supplementation on current value.      Fritz Thompson MD Resident        I have reviewed and concur with the resident's history, physical, assessment, and plan.  I have personally interviewed and examined the patient at bedside.      Ayo Jordan M.D. Staff Endocrinology

## 2022-03-24 NOTE — PLAN OF CARE
Patient tolerated procedure well. Patient reports 0/10 pain after procedure. Assisted patient up for first time. Gait steady. All discharge instructions given.

## 2022-03-24 NOTE — DISCHARGE SUMMARY
Discharge Note  Short Stay      SUMMARY     Admit Date: 3/24/2022    Attending Physician: Devang Jean      Discharge Physician: Devang Jean      Discharge Date: 3/24/2022 2:15 PM    Procedure(s) (LRB):  RADIOFREQUENCY ABLATION, L3-L4-L5 MEDIAL BRANCH 1 OF 2 (Right)    Final Diagnosis: Lumbar spondylosis [M47.816]    Disposition: Home or self care    Patient Instructions:   Current Discharge Medication List      CONTINUE these medications which have NOT CHANGED    Details   acetaminophen (TYLENOL) 500 MG tablet 1,000 mg in morning and night      atorvastatin (LIPITOR) 20 MG tablet Take 1 tablet by mouth every evening.      busPIRone (BUSPAR) 15 MG tablet 15mg qam 30mg qhs      divalproex (DEPAKOTE) 250 MG EC tablet Take 3 tablets by mouth 2 (two) times a day.      levothyroxine (SYNTHROID) 125 MCG tablet Take 1 tablet by mouth once daily. Synthorid only      loperamide (IMODIUM) 2 mg capsule Take 2 mg by mouth 2 (two) times daily as needed.      metoprolol tartrate (LOPRESSOR) 25 MG tablet Take 1 tablet by mouth once daily.      multivitamin,tx-iron-minerals (COMPLETE MULTIVITAMIN) Tab morning      omeprazole (PRILOSEC) 40 MG capsule Take 1 capsule by mouth once daily.      STIOLTO RESPIMAT 2.5-2.5 mcg/actuation Mist Take 2 puffs by mouth once daily.      sucralfate (CARAFATE) 1 gram tablet Take 1 g by mouth once daily.      UNABLE TO FIND medication name: Albutrol /Ipratr sol 60x3ml 4 times day as needed         STOP taking these medications       ergocalciferol (ERGOCALCIFEROL) 50,000 unit Cap Comments:   Reason for Stopping:         predniSONE (DELTASONE) 10 MG tablet Comments:   Reason for Stopping:                   Discharge Diagnosis: Lumbar spondylosis [M47.816]  Condition on Discharge: Stable with no complications to procedure   Diet on Discharge: Same as before.  Activity: as per instruction sheet.  Discharge to: Home with a responsible adult.  Follow up: 2-4 weeks

## 2022-03-25 ENCOUNTER — PATIENT MESSAGE (OUTPATIENT)
Dept: ENDOCRINOLOGY | Facility: CLINIC | Age: 57
End: 2022-03-25
Payer: COMMERCIAL

## 2022-03-25 RX ORDER — LEVOTHYROXINE SODIUM 137 UG/1
137 TABLET ORAL
Qty: 90 TABLET | Refills: 3 | Status: SHIPPED | OUTPATIENT
Start: 2022-03-25 | End: 2022-03-28

## 2022-03-28 ENCOUNTER — PATIENT MESSAGE (OUTPATIENT)
Dept: ENDOCRINOLOGY | Facility: CLINIC | Age: 57
End: 2022-03-28
Payer: COMMERCIAL

## 2022-03-28 RX ORDER — ERGOCALCIFEROL 1.25 MG/1
50000 CAPSULE ORAL
Qty: 12 CAPSULE | Refills: 3 | Status: SHIPPED | OUTPATIENT
Start: 2022-03-28 | End: 2022-11-03

## 2022-03-28 RX ORDER — LEVOTHYROXINE SODIUM 137 UG/1
137 TABLET ORAL
Qty: 90 TABLET | Refills: 3 | Status: SHIPPED | OUTPATIENT
Start: 2022-03-28 | End: 2023-02-23 | Stop reason: SDUPTHER

## 2022-03-31 ENCOUNTER — HOSPITAL ENCOUNTER (OUTPATIENT)
Facility: OTHER | Age: 57
Discharge: HOME | End: 2022-03-31
Attending: ANESTHESIOLOGY | Admitting: ANESTHESIOLOGY
Payer: COMMERCIAL

## 2022-03-31 VITALS
HEART RATE: 68 BPM | WEIGHT: 214.31 LBS | SYSTOLIC BLOOD PRESSURE: 110 MMHG | BODY MASS INDEX: 27.5 KG/M2 | DIASTOLIC BLOOD PRESSURE: 55 MMHG | TEMPERATURE: 98 F | RESPIRATION RATE: 16 BRPM | OXYGEN SATURATION: 98 % | HEIGHT: 74 IN

## 2022-03-31 DIAGNOSIS — G89.29 CHRONIC PAIN: ICD-10-CM

## 2022-03-31 DIAGNOSIS — M47.817 SPONDYLOSIS OF LUMBOSACRAL JOINT WITHOUT MYELOPATHY: Primary | ICD-10-CM

## 2022-03-31 PROCEDURE — 99152 MOD SED SAME PHYS/QHP 5/>YRS: CPT | Performed by: ANESTHESIOLOGY

## 2022-03-31 PROCEDURE — 64635 DESTROY LUMB/SAC FACET JNT: CPT | Mod: LT | Performed by: ANESTHESIOLOGY

## 2022-03-31 PROCEDURE — 63600175 PHARM REV CODE 636 W HCPCS: Performed by: ANESTHESIOLOGY

## 2022-03-31 PROCEDURE — 99152 PR MOD CONSCIOUS SEDATION, SAME PHYS, 5+ YRS, FIRST 15 MIN: ICD-10-PCS | Mod: ,,, | Performed by: ANESTHESIOLOGY

## 2022-03-31 PROCEDURE — 64636 DESTROY L/S FACET JNT ADDL: CPT | Mod: LT,,, | Performed by: ANESTHESIOLOGY

## 2022-03-31 PROCEDURE — 99152 MOD SED SAME PHYS/QHP 5/>YRS: CPT | Mod: ,,, | Performed by: ANESTHESIOLOGY

## 2022-03-31 PROCEDURE — 64636 PR DESTROY L/S FACET JNT ADDL: ICD-10-PCS | Mod: LT,,, | Performed by: ANESTHESIOLOGY

## 2022-03-31 PROCEDURE — 64636 DESTROY L/S FACET JNT ADDL: CPT | Mod: LT | Performed by: ANESTHESIOLOGY

## 2022-03-31 PROCEDURE — 64635 DESTROY LUMB/SAC FACET JNT: CPT | Mod: LT,,, | Performed by: ANESTHESIOLOGY

## 2022-03-31 PROCEDURE — 64635 PR DESTROY LUMB/SAC FACET JNT: ICD-10-PCS | Mod: LT,,, | Performed by: ANESTHESIOLOGY

## 2022-03-31 PROCEDURE — 25000003 PHARM REV CODE 250: Performed by: ANESTHESIOLOGY

## 2022-03-31 RX ORDER — BUPIVACAINE HYDROCHLORIDE 2.5 MG/ML
INJECTION, SOLUTION EPIDURAL; INFILTRATION; INTRACAUDAL
Status: DISCONTINUED | OUTPATIENT
Start: 2022-03-31 | End: 2022-03-31 | Stop reason: HOSPADM

## 2022-03-31 RX ORDER — MIDAZOLAM HYDROCHLORIDE 1 MG/ML
INJECTION INTRAMUSCULAR; INTRAVENOUS
Status: DISCONTINUED | OUTPATIENT
Start: 2022-03-31 | End: 2022-03-31 | Stop reason: HOSPADM

## 2022-03-31 RX ORDER — FENTANYL CITRATE 50 UG/ML
INJECTION, SOLUTION INTRAMUSCULAR; INTRAVENOUS
Status: DISCONTINUED | OUTPATIENT
Start: 2022-03-31 | End: 2022-03-31 | Stop reason: HOSPADM

## 2022-03-31 RX ORDER — SODIUM CHLORIDE 9 MG/ML
500 INJECTION, SOLUTION INTRAVENOUS CONTINUOUS
Status: DISCONTINUED | OUTPATIENT
Start: 2022-03-31 | End: 2022-03-31 | Stop reason: HOSPADM

## 2022-03-31 RX ORDER — LIDOCAINE HYDROCHLORIDE 20 MG/ML
INJECTION, SOLUTION INFILTRATION; PERINEURAL
Status: DISCONTINUED | OUTPATIENT
Start: 2022-03-31 | End: 2022-03-31 | Stop reason: HOSPADM

## 2022-03-31 RX ORDER — DEXAMETHASONE SODIUM PHOSPHATE 10 MG/ML
INJECTION INTRAMUSCULAR; INTRAVENOUS
Status: DISCONTINUED | OUTPATIENT
Start: 2022-03-31 | End: 2022-03-31 | Stop reason: HOSPADM

## 2022-03-31 NOTE — PLAN OF CARE
Patient tolerated procedure well. Patient reports 1/10 pain after procedure. Assisted patient up for first time. Gait steady. All discharge instructions given.

## 2022-03-31 NOTE — H&P
HPI  Patient presenting for Procedure(s) (LRB):  RADIOFREQUENCY ABLATION, L3-L4-L5 MEDIAL BRANCH 2 OF 2 (Left)     Patient on Anti-coagulation No    No health changes since previous encounter    Past Medical History:   Diagnosis Date    A-fib     Acid reflux     Agitation     Onofre's esophagus     Chronic back pain     COPD (chronic obstructive pulmonary disease)     Depression     GERD (gastroesophageal reflux disease)     Graves disease     Heart disease     History of stomach ulcers     Hyperlipidemia     Hypothyroidism     Impairment of cognitive function     Memory loss     Migraines     Mood disorder     PTSD (post-traumatic stress disorder)     Seizures     Substance abuse     TBI (traumatic brain injury)      Past Surgical History:   Procedure Laterality Date    EPIDURAL STEROID INJECTION N/A 1/21/2022    Procedure: INJECTION, STEROID, EPIDURAL L4/5 NEEDS CONSENT;  Surgeon: Devang Jean MD;  Location: [facility] PAIN [facility]GT;  Service: Pain Management;  Laterality: N/A;    INJECTION OF ANESTHETIC AGENT AROUND NERVE Bilateral 3/4/2022    Procedure: BLOCK, NERVE BILATERAL MEDIAL BRANCH L3,L4,L5 1st, needs consent;  Surgeon: Devang Jean MD;  Location: [facility];  Service: Pain Management;  Laterality: Bilateral;    INJECTION OF ANESTHETIC AGENT AROUND NERVE Bilateral 3/11/2022    Procedure: BLOCK, NERVE MEDIAL BRANCH  BILATERAL L3,L4,L5 2nd, needs consent;  Surgeon: Devang Jean MD;  Location: [facility];  Service: Pain Management;  Laterality: Bilateral;    RADIOFREQUENCY ABLATION Right 3/24/2022    Procedure: RADIOFREQUENCY ABLATION, L3-L4-L5 MEDIAL BRANCH 1 OF 2;  Surgeon: Devang Jean MD;  Location: [facility] PAIN [facility]GT;  Service: Pain Management;  Laterality: Right;     Review of patient's allergies indicates:   Allergen Reactions    Celecoxib Diarrhea    Nsaids (non-steroidal anti-inflammatory drug) Other (See Comments)    Lacosamide Anxiety, Hallucinations and Nausea  And Vomiting    Levetiracetam Anxiety      No current facility-administered medications for this encounter.       PMHx, PSHx, Allergies, Medications reviewed in epic    ROS negative except pain complaints in HPI    OBJECTIVE:    There were no vitals taken for this visit.    PHYSICAL EXAMINATION:    GENERAL: Well appearing, in no acute distress, alert and oriented x3.  PSYCH:  Mood and affect appropriate.  SKIN: Skin color, texture, turgor normal, no rashes or lesions which will impact the procedure.  CV: RRR with palpation of the radial artery.  PULM: No evidence of respiratory difficulty, symmetric chest rise. Clear to auscultation.  NEURO: Cranial nerves grossly intact.    Plan:    Proceed with procedure as planned Procedure(s) (LRB):  RADIOFREQUENCY ABLATION, L3-L4-L5 MEDIAL BRANCH 2 OF 2 (Left)    Isreal Rodney  03/31/2022

## 2022-03-31 NOTE — DISCHARGE INSTRUCTIONS
Thank you for allowing us to care for you today. You may receive a survey about the care we provided. Your feedback is valuable and helps us provide excellent care throughout the community.     Home Care Instructions for Pain Management:    1. DIET:   You may resume your normal diet today.   2. BATHING:   You may shower with luke warm water. No tub baths or anything that will soak injection sites under water for the next 24 hours.  3. DRESSING:   You may remove your bandage today.   4. ACTIVITY LEVEL:   You may resume your normal activities 24 hrs after your procedure. Nothing strenuous today.  5. MEDICATIONS:   You may resume your normal medications today. To restart blood thinners, ask your doctor.  6. DRIVING    If you have received any sedatives by mouth today, you may not drive for 12 hours.    If you have received any sedation through your IV, you may not drive for 24 hrs.   7. SPECIAL INSTRUCTIONS:   No heat to the injection site for 24 hrs including, hot bath or shower, heating pad, moist heat, or hot tubs.    Use ice pack to injection site for any pain or discomfort.  Apply ice packs for 20 minute intervals as needed.    IF you have diabetes, be sure to monitor your blood sugar more closely. IF your injection contained steroids your blood sugar levels may become higher than normal.    If you are still having pain upon discharge:  Your pain may improve over the next 48 hours. The anesthetic (numbing medication) works immediately to 48 hours. IF your injection contained a steroid (anti-inflammatory medication), it takes approximately 3 days to start feeling relief and 7-10 days to see your greatest results from the medication. It is possible you may need subsequent injections. This would be discussed at your follow up appointment with pain management or your referring doctor.    Please call the PAIN MANAGEMENT office at [phone] or ON CALL pager at [phone] if you experienced any:   -Weakness or  loss of sensation  -Fever > 101.5  -Pain uncontrolled with oral medications   -Persistent nausea, vomiting, or diarrhea  -Redness or drainage from the injection sites, or any other worrisome concerns.   If physician on call was not reached or could not communicate with our office for any reason please go to the nearest emergency department. Adult Procedural Sedation Instructions    Recovery After Procedural Sedation (Adult)  You have been given medicine by vein to make you sleep during your surgery. This may have included both a pain medicine and sleeping medicine. Most of the effects have worn off. But you may still have some drowsiness for the next 6 to 8 hours.  Home care  Follow these guidelines when you get home:  For the next 8 hours, you should be watched by a responsible adult. This person should make sure your condition is not getting worse.  Don't drink any alcohol for the next 24 hours.  Don't drive, operate dangerous machinery, or make important business or personal decisions during the next 24 hours.  Note: Your healthcare provider may tell you not to take any medicine by mouth for pain or sleep in the next 4 hours. These medicines may react with the medicines you were given in the hospital. This could cause a much stronger response than usual.  Follow-up care  Follow up with your healthcare provider if you are not alert and back to your usual level of activity within 12 hours.  When to seek medical advice  Call your healthcare provider right away if any of these occur:  Drowsiness gets worse  Weakness or dizziness gets worse  Repeated vomiting  You can't be awakened   Date Last Reviewed: 10/18/2016  © 0578-1252 [employer]. [address] [place] [address], [place] [address]. All rights reserved. This information is not intended as a substitute for professional medical care. Always follow your healthcare professional's instructions.

## 2022-03-31 NOTE — OP NOTE
Therapeutic Lumbar Medial Branch Radiofrequency Ablation under Fluoroscopy     The procedure, risks, benefits, and options were discussed with the patient. There are no contraindications to the procedure. The patent expressed understanding and agreed to the procedure. Informed written consent was obtained prior to the start of the procedure and can be found in the patient's chart.        PATIENT NAME: Binh Garvin   MRN: [id number]     DATE OF PROCEDURE: 03/31/2022     PROCEDURE:  Left L3, L4 and L5 Lumbar Radiofrequency Ablation under Fluoroscopy    PRE-OP DIAGNOSIS: Lumbar spondylosis [M47.816]    POST-OP DIAGNOSIS: Same    PHYSICIAN: Devang Jean MD    ASSISTANTS: Dr. Rodney     MEDICATIONS INJECTED:  Preservative-free Decadron 10mg with 9cc of Bupivicaine 0.25%    LOCAL ANESTHETIC INJECTED:   Xylocaine 2%    SEDATION:   Versed 2mg and Fentanyl 50mcg                                                                                                                                                                                     Conscious sedation ordered by M.D. Patient re-evaluation prior to administration of conscious sedation. No changes noted in patient's status from initial evaluation. The patient's vital signs were monitored by RN and patient remained hemodynamically stable throughout the procedure.    Event Time In   Sedation Start 1346   Sedation End 1400       ESTIMATED BLOOD LOSS:  None    COMPLICATIONS:  None     INTERVAL HISTORY: Patient has clinical and imaging findings suggestive of facet mediated pain. Patients has completed 2 previous diagnostic medial branch blocks at specified levels with at least 80% relief for the expected duration of the local anesthetic utilized.    TECHNIQUE: Time-out was performed to identify the patient and procedure to be performed. With the patient laying in a prone position, the surgical area was prepped and draped in the usual sterile fashion using ChloraPrep and  fenestrated drape. The levels were determined under fluoroscopic guidance. Skin anesthesia was achieved by injecting Lidocaine 2% over the injection sites. A 20 gauge 10mm curved active tip needle was introduced to the anatomic local of the medial branch at each of the above levels using AP, lateral and/or contralateral oblique fluoroscopic imaging. Then sensory and motor testing was performed to confirm that the needle tips were in the correct location. After negative aspiration for blood or CSF was confirmed, 1 mL of the lidocaine 2% listed above was injected slowly at each site. This was followed by thermal lesioning at 80 degrees celsius for 90 seconds. That was followed by slowly injecting 1 mL of the medication mixture listed above at each site. The needles were removed and bleeding was nil. A sterile dressing was applied. No specimens collected. The patient tolerated the procedure well and did not have any procedure related motor deficit at the conclusion of the procedure.    The patient was monitored after the procedure in the recovery area. They were given post-procedure and discharge instructions to follow at home. The patient was discharged in a stable condition.    I reviewed and edited the fellow's note. I conducted my own interview and physical examination. I agree with the findings. I was present and supervising all critical portions of the procedure.    Devang Jean MD

## 2022-03-31 NOTE — DISCHARGE SUMMARY
Discharge Note  Short Stay      SUMMARY     Admit Date: 3/31/2022    Attending Physician: Devang Jean      Discharge Physician: Devang Jean      Discharge Date: 3/31/2022 1:59 PM    Procedure(s) (LRB):  RADIOFREQUENCY ABLATION, L3-L4-L5 MEDIAL BRANCH 2 OF 2 (Left)    Final Diagnosis: Lumbar spondylosis [M47.816]    Disposition: Home or self care    Patient Instructions:   Current Discharge Medication List      CONTINUE these medications which have NOT CHANGED    Details   acetaminophen (TYLENOL) 500 MG tablet 1,000 mg in morning and night      atorvastatin (LIPITOR) 20 MG tablet Take 1 tablet by mouth every evening.      busPIRone (BUSPAR) 15 MG tablet 15mg qam 30mg qhs      divalproex (DEPAKOTE) 250 MG EC tablet Take 3 tablets by mouth 2 (two) times a day.      ergocalciferol (ERGOCALCIFEROL) 50,000 unit Cap Take 1 capsule (50,000 Units total) by mouth every 7 days.  Qty: 12 capsule, Refills: 3      levothyroxine (SYNTHROID) 137 MCG Tab tablet Take 1 tablet (137 mcg total) by mouth before breakfast. BRAND NAME SYNTHROID MEDICALLY NECESSARY  Qty: 90 tablet, Refills: 3      loperamide (IMODIUM) 2 mg capsule Take 2 mg by mouth 2 (two) times daily as needed.      metoprolol tartrate (LOPRESSOR) 25 MG tablet Take 1 tablet by mouth once daily.      multivitamin,tx-iron-minerals (COMPLETE MULTIVITAMIN) Tab morning      omeprazole (PRILOSEC) 40 MG capsule Take 1 capsule by mouth once daily.      STIOLTO RESPIMAT 2.5-2.5 mcg/actuation Mist Take 2 puffs by mouth once daily.      sucralfate (CARAFATE) 1 gram tablet Take 1 g by mouth once daily.      UNABLE TO FIND medication name: Albutrol /Ipratr sol 60x3ml 4 times day as needed                 Discharge Diagnosis: Lumbar spondylosis [M47.816]  Condition on Discharge: Stable with no complications to procedure   Diet on Discharge: Same as before.  Activity: as per instruction sheet.  Discharge to: Home with a responsible adult.  Follow up: 2-4 weeks

## 2022-05-09 ENCOUNTER — PATIENT MESSAGE (OUTPATIENT)
Dept: ENDOCRINOLOGY | Facility: CLINIC | Age: 57
End: 2022-05-09
Payer: COMMERCIAL

## 2022-05-13 ENCOUNTER — PATIENT MESSAGE (OUTPATIENT)
Dept: PAIN MEDICINE | Facility: CLINIC | Age: 57
End: 2022-05-13
Payer: COMMERCIAL

## 2022-05-13 NOTE — OP NOTE
If he has been on it at home, I want him still on it, he does have leg swelling Therapeutic Lumbar Medial Branch Radiofrequency Ablation under Fluoroscopy     The procedure, risks, benefits, and options were discussed with the patient. There are no contraindications to the procedure. The patent expressed understanding and agreed to the procedure. Informed written consent was obtained prior to the start of the procedure and can be found in the patient's chart.        PATIENT NAME: Binh Garvin   MRN: [id number]     DATE OF PROCEDURE: 03/24/2022     PROCEDURE:  Right L3, L4 and L5 Lumbar Radiofrequency Ablation under Fluoroscopy    PRE-OP DIAGNOSIS: Lumbar spondylosis [M47.816]    POST-OP DIAGNOSIS: Same    PHYSICIAN: Devang Jean MD    ASSISTANTS: Dr. Munson     MEDICATIONS INJECTED:  Preservative-free Decadron 10mg with 9cc of Bupivicaine 0.25%    LOCAL ANESTHETIC INJECTED:   Xylocaine 2%    SEDATION:   Versed 2mg and Fentanyl 150 mcg                                                                                                                                                                                     Conscious sedation ordered by M.D. Patient re-evaluation prior to administration of conscious sedation. No changes noted in patient's status from initial evaluation. The patient's vital signs were monitored by RN and patient remained hemodynamically stable throughout the procedure.    Event Time In   Sedation Start 1400   Sedation End 1416       ESTIMATED BLOOD LOSS:  None    COMPLICATIONS:  None     INTERVAL HISTORY: Patient has clinical and imaging findings suggestive of facet mediated pain. Patients has completed 2 previous diagnostic medial branch blocks at specified levels with at least 80% relief for the expected duration of the local anesthetic utilized.    TECHNIQUE: Time-out was performed to identify the patient and procedure to be performed. With the patient laying in a prone position, the surgical area was prepped and draped in the usual sterile fashion using ChloraPrep and  fenestrated drape. The levels were determined under fluoroscopic guidance. Skin anesthesia was achieved by injecting Lidocaine 2% over the injection sites. A 20 gauge 10mm curved active tip needle was introduced to the anatomic local of the medial branch at each of the above levels using AP, lateral and/or contralateral oblique fluoroscopic imaging. Then sensory and motor testing was performed to confirm that the needle tips were in the correct location. After negative aspiration for blood or CSF was confirmed, 1 mL of the lidocaine 2% listed above was injected slowly at each site. This was followed by thermal lesioning at 80 degrees celsius for 90 seconds. That was followed by slowly injecting 1 mL of the medication mixture listed above at each site. The needles were removed and bleeding was nil. A sterile dressing was applied. No specimens collected. The patient tolerated the procedure well and did not have any procedure related motor deficit at the conclusion of the procedure.    The patient was monitored after the procedure in the recovery area. They were given post-procedure and discharge instructions to follow at home. The patient was discharged in a stable condition.    I reviewed and edited the fellow's note. I conducted my own interview and physical examination. I agree with the findings. I was present and supervising all critical portions of the procedure.    Devang Jean MD

## 2022-06-14 ENCOUNTER — PATIENT MESSAGE (OUTPATIENT)
Dept: ENDOCRINOLOGY | Facility: CLINIC | Age: 57
End: 2022-06-14
Payer: COMMERCIAL

## 2022-06-14 DIAGNOSIS — E89.0 HYPOTHYROIDISM, POSTABLATIVE: Primary | ICD-10-CM

## 2022-06-15 ENCOUNTER — PATIENT MESSAGE (OUTPATIENT)
Dept: ENDOCRINOLOGY | Facility: CLINIC | Age: 57
End: 2022-06-15
Payer: COMMERCIAL

## 2022-06-15 ENCOUNTER — PATIENT MESSAGE (OUTPATIENT)
Dept: PAIN MEDICINE | Facility: CLINIC | Age: 57
End: 2022-06-15
Payer: COMMERCIAL

## 2022-06-15 ENCOUNTER — LAB VISIT (OUTPATIENT)
Dept: LAB | Facility: HOSPITAL | Age: 57
End: 2022-06-15
Attending: INTERNAL MEDICINE
Payer: COMMERCIAL

## 2022-06-15 DIAGNOSIS — E89.0 HYPOTHYROIDISM, POSTABLATIVE: ICD-10-CM

## 2022-06-15 DIAGNOSIS — E89.0 HYPOTHYROIDISM, POSTABLATIVE: Primary | ICD-10-CM

## 2022-06-15 LAB
T4 FREE SERPL-MCNC: 1.03 NG/DL (ref 0.71–1.51)
TSH SERPL DL<=0.005 MIU/L-ACNC: 1.59 UIU/ML (ref 0.4–4)

## 2022-06-15 PROCEDURE — 36415 COLL VENOUS BLD VENIPUNCTURE: CPT | Performed by: INTERNAL MEDICINE

## 2022-06-15 PROCEDURE — 84439 ASSAY OF FREE THYROXINE: CPT | Performed by: INTERNAL MEDICINE

## 2022-06-15 PROCEDURE — 84443 ASSAY THYROID STIM HORMONE: CPT | Performed by: INTERNAL MEDICINE

## 2022-07-12 ENCOUNTER — PATIENT MESSAGE (OUTPATIENT)
Dept: ENDOCRINOLOGY | Facility: CLINIC | Age: 57
End: 2022-07-12
Payer: COMMERCIAL

## 2022-08-01 ENCOUNTER — PATIENT MESSAGE (OUTPATIENT)
Dept: ENDOCRINOLOGY | Facility: CLINIC | Age: 57
End: 2022-08-01
Payer: COMMERCIAL

## 2022-09-09 ENCOUNTER — TELEPHONE (OUTPATIENT)
Dept: PAIN MEDICINE | Facility: CLINIC | Age: 57
End: 2022-09-09
Payer: COMMERCIAL

## 2022-09-09 NOTE — TELEPHONE ENCOUNTER
Staff spoke with patient wife to confirm appointment 9/12/22 @ 8 am with  on the 9th floor [address].

## 2022-10-03 ENCOUNTER — PATIENT MESSAGE (OUTPATIENT)
Dept: ENDOCRINOLOGY | Facility: CLINIC | Age: 57
End: 2022-10-03
Payer: COMMERCIAL

## 2022-10-17 ENCOUNTER — PATIENT MESSAGE (OUTPATIENT)
Dept: ENDOCRINOLOGY | Facility: CLINIC | Age: 57
End: 2022-10-17
Payer: COMMERCIAL

## 2022-10-18 ENCOUNTER — LAB VISIT (OUTPATIENT)
Dept: LAB | Facility: HOSPITAL | Age: 57
End: 2022-10-18
Attending: INTERNAL MEDICINE
Payer: COMMERCIAL

## 2022-10-18 ENCOUNTER — PATIENT MESSAGE (OUTPATIENT)
Dept: ENDOCRINOLOGY | Facility: CLINIC | Age: 57
End: 2022-10-18
Payer: COMMERCIAL

## 2022-10-18 ENCOUNTER — OFFICE VISIT (OUTPATIENT)
Dept: PAIN MEDICINE | Facility: CLINIC | Age: 57
End: 2022-10-18
Payer: COMMERCIAL

## 2022-10-18 VITALS
HEART RATE: 55 BPM | SYSTOLIC BLOOD PRESSURE: 125 MMHG | TEMPERATURE: 97 F | DIASTOLIC BLOOD PRESSURE: 81 MMHG | WEIGHT: 243.81 LBS | HEIGHT: 74 IN | BODY MASS INDEX: 31.29 KG/M2

## 2022-10-18 DIAGNOSIS — E89.0 HYPOTHYROIDISM, POSTABLATIVE: ICD-10-CM

## 2022-10-18 DIAGNOSIS — G89.4 CHRONIC PAIN DISORDER: Primary | ICD-10-CM

## 2022-10-18 DIAGNOSIS — M51.36 DDD (DEGENERATIVE DISC DISEASE), LUMBAR: ICD-10-CM

## 2022-10-18 DIAGNOSIS — M47.816 LUMBAR SPONDYLOSIS: ICD-10-CM

## 2022-10-18 LAB
T4 FREE SERPL-MCNC: 1.1 NG/DL (ref 0.71–1.51)
TSH SERPL DL<=0.005 MIU/L-ACNC: 0.99 UIU/ML (ref 0.4–4)

## 2022-10-18 PROCEDURE — 84439 ASSAY OF FREE THYROXINE: CPT | Performed by: INTERNAL MEDICINE

## 2022-10-18 PROCEDURE — 84443 ASSAY THYROID STIM HORMONE: CPT | Performed by: INTERNAL MEDICINE

## 2022-10-18 PROCEDURE — 1159F MED LIST DOCD IN RCRD: CPT | Mod: CPTII,S$GLB,, | Performed by: ANESTHESIOLOGY

## 2022-10-18 PROCEDURE — 3079F DIAST BP 80-89 MM HG: CPT | Mod: CPTII,S$GLB,, | Performed by: ANESTHESIOLOGY

## 2022-10-18 PROCEDURE — 99213 PR OFFICE/OUTPT VISIT, EST, LEVL III, 20-29 MIN: ICD-10-PCS | Mod: S$GLB,,, | Performed by: ANESTHESIOLOGY

## 2022-10-18 PROCEDURE — 36415 COLL VENOUS BLD VENIPUNCTURE: CPT | Performed by: INTERNAL MEDICINE

## 2022-10-18 PROCEDURE — 3074F SYST BP LT 130 MM HG: CPT | Mod: CPTII,S$GLB,, | Performed by: ANESTHESIOLOGY

## 2022-10-18 PROCEDURE — 1159F PR MEDICATION LIST DOCUMENTED IN MEDICAL RECORD: ICD-10-PCS | Mod: CPTII,S$GLB,, | Performed by: ANESTHESIOLOGY

## 2022-10-18 PROCEDURE — 1160F RVW MEDS BY RX/DR IN RCRD: CPT | Mod: CPTII,S$GLB,, | Performed by: ANESTHESIOLOGY

## 2022-10-18 PROCEDURE — 1160F PR REVIEW ALL MEDS BY PRESCRIBER/CLIN PHARMACIST DOCUMENTED: ICD-10-PCS | Mod: CPTII,S$GLB,, | Performed by: ANESTHESIOLOGY

## 2022-10-18 PROCEDURE — 99999 PR PBB SHADOW E&M-EST. PATIENT-LVL IV: CPT | Mod: PBBFAC,,, | Performed by: ANESTHESIOLOGY

## 2022-10-18 PROCEDURE — 3079F PR MOST RECENT DIASTOLIC BLOOD PRESSURE 80-89 MM HG: ICD-10-PCS | Mod: CPTII,S$GLB,, | Performed by: ANESTHESIOLOGY

## 2022-10-18 PROCEDURE — 99999 PR PBB SHADOW E&M-EST. PATIENT-LVL IV: ICD-10-PCS | Mod: PBBFAC,,, | Performed by: ANESTHESIOLOGY

## 2022-10-18 PROCEDURE — 99213 OFFICE O/P EST LOW 20 MIN: CPT | Mod: S$GLB,,, | Performed by: ANESTHESIOLOGY

## 2022-10-18 PROCEDURE — 3074F PR MOST RECENT SYSTOLIC BLOOD PRESSURE < 130 MM HG: ICD-10-PCS | Mod: CPTII,S$GLB,, | Performed by: ANESTHESIOLOGY

## 2022-10-18 RX ORDER — FLUOXETINE 10 MG/1
10 CAPSULE ORAL DAILY
COMMUNITY
Start: 2022-10-11

## 2022-10-18 NOTE — PROGRESS NOTES
Chronic patient Established Note (Follow up visit)      Interval History 10/18/2022:    Binh Garvin presents today with chronic lower back pain S/P lumbar RFA with almost complete pain relief that remain until today.      Interval History 2/21/2022:    Binh Garvin presents to the clinic for a follow-up appointment for chronic lower back pain with no radiation. Since the last visit, Binh Garvin states the pain has been persistant. Current pain intensity is 7/10. He reports no relief from LESI.       HPI:    Binh Garvin presents to the clinic for the evaluation of lower back pain. The pain started over 20 years ago following broken back in college and symptoms have been worsening.The pain is located in the lower back area. Reports that he broke his back over 20 years ago after which he wore a corset for many weeks and he did not need surgery. The pain is described as aching, numbing and sharp and is rated as 4/10. The pain is rated with a score of  2/10 on the BEST day and a score of 9/10 on the WORST day.  Symptoms interfere with daily activity and work. The pain is exacerbated by Sitting, Standing, Bending, Eating, Walking, Night Time, Morning and Lifting. Pain centers to the lumbar region and does not radiate to the hips. The pain is mitigated by heat, massage, medications, physical therapy, rest and sitting. He was getting injections in and around his spine in [id number]. At this time and in 2021 he attempted PT but was told to stop by a physician last year and he admits that it was not providing benefit. He has been out of work for a year 2/2 to epilepsy for which he was evaluated at the [facility]. There an MRI spine was done.      He reports spending 10 hours per day reclining. The patient reports 2 hours of uninterrupted sleep per night.      Patient reports bowel incontinence but this is after starting new seizure medications. Denies urinary incontinence.     Physical Therapy/Home Exercise: yes     Pain  Disability Index Review:  Last 3 PDI Scores 2022   Pain Disability Index (PDI) 6 49       Pain Medications:  Tylenol, Lyrica (past), Gabapentin (past)      Opioid Contract: not applicable     report:  Not applicable    Pain Procedures:     2022: Lumbar Interlaminar Epidural Steroid Injection L4/L5 under Fluoroscopic Guidance with no relief.  3/24/2022: Right L3, L4 and L5 Lumbar Radiofrequency Ablation under Fluoroscopy 100% pain relief  3/31/2022: Left L3, L4 and L5 Lumbar Radiofrequency Ablation under Fluoroscopy 100 % pain relief.     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      busPIRone (BUSPAR) 15 MG tablet 15mg qam 30mg qhs      divalproex (DEPAKOTE) 250 MG EC tablet Take 3 tablets by mouth 2 (two) times a day.      FLUoxetine 10 MG capsule Take 10 mg by mouth once daily.      levothyroxine (SYNTHROID) 137 MCG Tab tablet Take 1 tablet (137 mcg total) by mouth before breakfast. BRAND NAME SYNTHROID MEDICALLY NECESSARY 90 tablet 3    loperamide (IMODIUM) 2 mg capsule Take 2 mg by mouth 2 (two) times daily as needed.      metoprolol tartrate (LOPRESSOR) 25 MG tablet Take 1 tablet by mouth once daily.      multivitamin,tx-iron-minerals (COMPLETE MULTIVITAMIN) Tab morning      omeprazole (PRILOSEC) 40 MG capsule Take 1 capsule by mouth once daily.      STIOLTO RESPIMAT 2.5-2.5 mcg/actuation Mist Take 2 puffs by mouth once daily.      sucralfate  (CARAFATE) 1 gram tablet Take 1 g by mouth once daily.      acetaminophen (TYLENOL) 500 MG tablet 1,000 mg in morning and night      ergocalciferol (ERGOCALCIFEROL) 50,000 unit Cap Take 1 capsule (50,000 Units total) by mouth every 7 days. (Patient not taking: Reported on 10/18/2022) 12 capsule 3    UNABLE TO FIND medication name: Albutrol /Ipratr sol 60x3ml 4 times day as needed       No current facility-administered medications for this visit.       REVIEW OF SYSTEMS:    GENERAL:  No weight loss, malaise or fevers.  HEENT:  Negative for frequent or significant headaches.  NECK:  Negative for lumps, goiter, pain and significant neck swelling.  RESPIRATORY:  Negative for cough, wheezing or shortness of breath.  CARDIOVASCULAR:  Negative for chest pain, leg swelling or palpitations.  GI:  Negative for abdominal discomfort, blood in stools or black stools or change in bowel habits.  MUSCULOSKELETAL:  + Lower back pain  SKIN:  Negative for lesions, rash, and itching.  PSYCH:  Negative for sleep disturbance, mood disorder and recent psychosocial stressors.  HEMATOLOGY/LYMPHOLOGY:  Negative for prolonged bleeding, bruising easily or swollen nodes.  NEURO:   No history of headaches, syncope, paralysis, seizures or tremors.  All other reviewed and negative other than HPI.    Past Medical History:  Past Medical History:   Diagnosis Date    A-fib     Acid reflux     Agitation     Onofre's esophagus     Chronic back pain     COPD (chronic obstructive pulmonary disease)     Depression     GERD (gastroesophageal reflux disease)     Graves disease     Heart disease     History of stomach ulcers     Hyperlipidemia     Hypothyroidism     Impairment of cognitive function     Memory loss     Migraines     Mood disorder     PTSD (post-traumatic stress disorder)     Seizures     Substance abuse     TBI (traumatic brain injury)        Past Surgical History:  Past Surgical History:   Procedure Laterality Date    EPIDURAL STEROID  "INJECTION N/A 1/21/2022    Procedure: INJECTION, STEROID, EPIDURAL L4/5 NEEDS CONSENT;  Surgeon: Devang Jean MD;  Location: [facility];  Service: Pain Management;  Laterality: N/A;    INJECTION OF ANESTHETIC AGENT AROUND NERVE Bilateral 3/4/2022    Procedure: BLOCK, NERVE BILATERAL MEDIAL BRANCH L3,L4,L5 1st, needs consent;  Surgeon: Devang Jean MD;  Location: [facility];  Service: Pain Management;  Laterality: Bilateral;    INJECTION OF ANESTHETIC AGENT AROUND NERVE Bilateral 3/11/2022    Procedure: BLOCK, NERVE MEDIAL BRANCH  BILATERAL L3,L4,L5 2nd, needs consent;  Surgeon: Devang Jean MD;  Location: [facility];  Service: Pain Management;  Laterality: Bilateral;    RADIOFREQUENCY ABLATION Right 3/24/2022    Procedure: RADIOFREQUENCY ABLATION, L3-L4-L5 MEDIAL BRANCH 1 OF 2;  Surgeon: Devang Jean MD;  Location: [facility];  Service: Pain Management;  Laterality: Right;    RADIOFREQUENCY ABLATION Left 3/31/2022    Procedure: RADIOFREQUENCY ABLATION, L3-L4-L5 MEDIAL BRANCH 2 OF 2;  Surgeon: Devang Jean MD;  Location: [facility];  Service: Pain Management;  Laterality: Left;       Family History:  History reviewed. No pertinent family history.    Social History:  Social History     Socioeconomic History    Marital status:    Tobacco Use    Smoking status: Every Day     Types: Cigarettes    Smokeless tobacco: Never   Substance and Sexual Activity    Alcohol use: Never    Drug use: Never       OBJECTIVE:    /81 (BP Location: Right arm, Patient Position: Sitting, BP Method: Large (Automatic))   Pulse (!) 55   Temp 96.6 °F (35.9 °C)   Ht 6' 2" (1.88 m)   Wt 110.6 kg (243 lb 13.3 oz)   BMI 31.31 kg/m²     PHYSICAL EXAMINATION:    General appearance: Well appearing, in no acute distress, alert and oriented x3.  Psych:  Mood and affect appropriate.  Skin: Skin color, texture, turgor normal, no rashes or lesions, in both upper and lower body.  Head/face:  Atraumatic, " normocephalic. No palpable lymph nodes  Neck: No pain to palpation over the cervical paraspinous muscles. Spurling Negative. No pain with neck flexion, extension, or lateral flexion. .  Cor: RRR  Pulm: CTA  GI: Abdomen soft and non-tender.  Back: Straight leg raising in the sitting and supine positions is negative to radicular pain. Tenderness to palpation over bilateral lumbar prespinal musles with positive facet loading.   Extremities: Peripheral joint ROM is full and pain free without obvious instability or laxity in all four extremities. No deformities, edema, or skin discoloration. Good capillary refill.  Musculoskeletal: Shoulder, hip, sacroiliac and knee provocative maneuvers are negative. Bilateral upper and lower extremity strength is normal and symmetric.  No atrophy or tone abnormalities are noted.  Neuro: Bilateral upper and lower extremity coordination and muscle stretch reflexes are physiologic and symmetric.  Plantar response are downgoing. No loss of sensation is noted.  Gait: Normal.    ASSESSMENT: 56 y.o. year old male with lumbar spondylosis.     1. Chronic pain disorder        2. Lumbar spondylosis        3. DDD (degenerative disc disease), lumbar              PLAN:     - I have stressed the importance of physical activity and a home exercise plan to help with pain and improve health.  - Encouraged to continue home exercises.   - We will consider repeat lumbar RFA in the future.   - RTC as needed.   - Counseled patient regarding the importance of activity modification and physical therapy.    The above plan and management options were discussed at length with patient. Patient is in agreement with the above and verbalized understanding.    Devang Jean  10/18/2022

## 2022-10-19 ENCOUNTER — PATIENT MESSAGE (OUTPATIENT)
Dept: ENDOCRINOLOGY | Facility: CLINIC | Age: 57
End: 2022-10-19
Payer: COMMERCIAL

## 2022-10-19 DIAGNOSIS — E89.0 HYPOTHYROIDISM, POSTABLATIVE: Primary | ICD-10-CM

## 2023-02-12 ENCOUNTER — PATIENT MESSAGE (OUTPATIENT)
Dept: ENDOCRINOLOGY | Facility: CLINIC | Age: 58
End: 2023-02-12

## 2023-02-15 ENCOUNTER — LAB VISIT (OUTPATIENT)
Dept: LAB | Facility: HOSPITAL | Age: 58
End: 2023-02-15
Attending: INTERNAL MEDICINE

## 2023-02-15 DIAGNOSIS — E89.0 HYPOTHYROIDISM, POSTABLATIVE: ICD-10-CM

## 2023-02-15 LAB
T4 FREE SERPL-MCNC: 1.03 NG/DL (ref 0.71–1.51)
TSH SERPL DL<=0.005 MIU/L-ACNC: 3.11 UIU/ML (ref 0.4–4)

## 2023-02-15 PROCEDURE — 36415 COLL VENOUS BLD VENIPUNCTURE: CPT | Performed by: INTERNAL MEDICINE

## 2023-02-15 PROCEDURE — 84443 ASSAY THYROID STIM HORMONE: CPT | Performed by: INTERNAL MEDICINE

## 2023-02-15 PROCEDURE — 84439 ASSAY OF FREE THYROXINE: CPT | Performed by: INTERNAL MEDICINE

## 2023-02-23 ENCOUNTER — PATIENT MESSAGE (OUTPATIENT)
Dept: ENDOCRINOLOGY | Facility: CLINIC | Age: 58
End: 2023-02-23

## 2023-02-28 ENCOUNTER — OFFICE VISIT (OUTPATIENT)
Dept: ENDOCRINOLOGY | Facility: CLINIC | Age: 58
End: 2023-02-28

## 2023-02-28 DIAGNOSIS — K22.719 BARRETT'S ESOPHAGUS WITH DYSPLASIA: ICD-10-CM

## 2023-02-28 DIAGNOSIS — E89.0 HYPOTHYROIDISM, POSTABLATIVE: Primary | ICD-10-CM

## 2023-02-28 DIAGNOSIS — E55.9 VITAMIN D DEFICIENCY: ICD-10-CM

## 2023-02-28 DIAGNOSIS — E05.00 GRAVES DISEASE: ICD-10-CM

## 2023-02-28 PROCEDURE — 99214 PR OFFICE/OUTPT VISIT, EST, LEVL IV, 30-39 MIN: ICD-10-PCS | Mod: 95,,, | Performed by: INTERNAL MEDICINE

## 2023-02-28 PROCEDURE — 99214 OFFICE O/P EST MOD 30 MIN: CPT | Mod: 95,,, | Performed by: INTERNAL MEDICINE

## 2023-02-28 NOTE — PROGRESS NOTES
"Subjective:      Patient ID: Binh Garvin is a 57 y.o. male.    Chief Complaint:  Hypothyroidism    The patient location is: Home  The chief complaint leading to consultation is: Hypothyroidism    Visit type: audiovisual    Face to Face time with patient: 10 min  25 minutes of total time spent on the encounter, which includes face to face time and non-face to face time preparing to see the patient (eg, review of tests), Obtaining and/or reviewing separately obtained history, Documenting clinical information in the electronic or other health record, Independently interpreting results (not separately reported) and communicating results to the patient/family/caregiver, or Care coordination (not separately reported).     Each patient to whom he or she provides medical services by telemedicine is:  (1) informed of the relationship between the physician and patient and the respective role of any other health care provider with respect to management of the patient; and (2) notified that he or she may decline to receive medical services by telemedicine and may withdraw from such care at any time.    History of Present Illness  Mr. Garvin presents for follow up of hypothyroidism. Last visit 3/2022. PMH of seizures, Afib, COPD , Barrets, GERD, HLD, Cognitive impairment, Migraines. 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 He was "given a radiation pill" Referred to a Dr. Maradiaga.      Prior to establishing care with me he had very labile TSH levels requiring frequent dose adjustments.      Since his last visit his TSH level has been more stable. However, they lost insurance and he was unable to get brand name Synthroid so he was taking a reduced dose of 100 mcg for the past month. He restarted his previous dose of levothyroxine at 137 mcg 3 days ago.      Family history of thyroid disease: mother has hypothyroidism and " believes his brother is also hypothyroidism     Was on Liothyronine .5 daily in the past but removed after he was diagnosed with atrial fibrillation    He has been taking his thyroid hormone on an empty stomach and not missing any doses.      No dysphagia, hoarseness or voice changes.      Latest Reference Range & Units 02/15/23 12:37   TSH 0.400 - 4.000 uIU/mL 3.109   TSH is on reduced dose of 100 mcg for 3 weeks.      On ergo 50 k weekly for vit D def.     Review of Systems   Constitutional:  Negative for chills and fever.   Gastrointestinal:  Negative for nausea and vomiting.     Objective:   Physical Exam  Constitutional:       General: He is not in acute distress.     Appearance: Normal appearance. He is not ill-appearing.   Neurological:      Mental Status: He is alert.     BP Readings from Last 3 Encounters:   10/18/22 125/81   03/31/22 (!) 110/55   03/24/22 106/67     Wt Readings from Last 1 Encounters:   10/18/22 0908 110.6 kg (243 lb 13.3 oz)       There is no height or weight on file to calculate BMI.    Lab Review:   No results found for: HGBA1C  No results found for: CHOL, HDL, LDLCALC, TRIG, CHOLHDL  Lab Results   Component Value Date    TSH 3.109 02/15/2023         Assessment and Plan     Hypothyroidism, postablative  --Continue levothyroxine 137 mcg once daily  --Will repeat TSH in 4-6 weeks to ensure dose adequate  --He will be trying to get patient assistance for Synthroid  --Goal is for a normal TSH to avoid CV and bone complications    Graves disease  --History of Grave's s/p GALARZA ablation    Vitamin D deficiency  --On ergo 50 k weekly  --Repeat Vit D level with next labs    Onofre's esophagus with dysplasia  --Has history of Ryan's and gets regular EGD  --Reports family history of esophageal cancer  --Requesting to establish care with GI at [facility]      Needs TSH and Vitamin D in 4-6 weeks, needs referral to GI, follow up in 1 year      Ayo Jordan M.D. Staff Endocrinology

## 2023-02-28 NOTE — ASSESSMENT & PLAN NOTE
--Continue levothyroxine 137 mcg once daily  --Will repeat TSH in 4-6 weeks to ensure dose adequate  --He will be trying to get patient assistance for Synthroid  --Goal is for a normal TSH to avoid CV and bone complications

## 2023-02-28 NOTE — ASSESSMENT & PLAN NOTE
--Has history of Ryan's and gets regular EGD  --Reports family history of esophageal cancer  --Requesting to establish care with GI at [facility]

## 2023-03-01 ENCOUNTER — PATIENT MESSAGE (OUTPATIENT)
Dept: ENDOCRINOLOGY | Facility: CLINIC | Age: 58
End: 2023-03-01

## 2023-06-20 DIAGNOSIS — E89.0 HYPOTHYROIDISM, POSTABLATIVE: Primary | ICD-10-CM

## 2023-06-20 RX ORDER — LEVOTHYROXINE SODIUM 137 UG/1
TABLET ORAL
Qty: 30 TABLET | Refills: 11 | Status: SHIPPED | OUTPATIENT
Start: 2023-06-20

## 2024-07-05 DIAGNOSIS — E89.0 HYPOTHYROIDISM, POSTABLATIVE: ICD-10-CM

## 2024-07-05 RX ORDER — LEVOTHYROXINE SODIUM 137 UG/1
137 TABLET ORAL DAILY
Qty: 30 TABLET | Refills: 1 | Status: SHIPPED | OUTPATIENT
Start: 2024-07-05

## 2024-07-05 RX ORDER — LEVOTHYROXINE SODIUM 137 UG/1
137 TABLET ORAL DAILY
Qty: 30 TABLET | Refills: 11 | OUTPATIENT
Start: 2024-07-05

## (undated) DEVICE — DRESSING LEUKOPLAST FLEX 1X3IN

## (undated) DEVICE — BANDAGE ADHESIVE